# Patient Record
Sex: MALE | Race: WHITE | NOT HISPANIC OR LATINO | URBAN - METROPOLITAN AREA
[De-identification: names, ages, dates, MRNs, and addresses within clinical notes are randomized per-mention and may not be internally consistent; named-entity substitution may affect disease eponyms.]

---

## 2018-03-12 ENCOUNTER — EMERGENCY (EMERGENCY)
Facility: HOSPITAL | Age: 33
LOS: 1 days | Discharge: HOME | End: 2018-03-12
Attending: EMERGENCY MEDICINE | Admitting: EMERGENCY MEDICINE
Payer: COMMERCIAL

## 2018-03-12 VITALS
SYSTOLIC BLOOD PRESSURE: 127 MMHG | DIASTOLIC BLOOD PRESSURE: 73 MMHG | RESPIRATION RATE: 16 BRPM | TEMPERATURE: 98 F | HEART RATE: 71 BPM | OXYGEN SATURATION: 98 %

## 2018-03-12 VITALS
SYSTOLIC BLOOD PRESSURE: 131 MMHG | HEART RATE: 76 BPM | TEMPERATURE: 98 F | OXYGEN SATURATION: 96 % | RESPIRATION RATE: 18 BRPM | DIASTOLIC BLOOD PRESSURE: 75 MMHG

## 2018-03-12 PROCEDURE — 99284 EMERGENCY DEPT VISIT MOD MDM: CPT | Mod: 25

## 2018-03-12 PROCEDURE — 99283 EMERGENCY DEPT VISIT LOW MDM: CPT | Mod: 25

## 2018-03-12 PROCEDURE — 93010 ELECTROCARDIOGRAM REPORT: CPT

## 2018-03-12 PROCEDURE — 93005 ELECTROCARDIOGRAM TRACING: CPT

## 2018-03-12 NOTE — ED ADULT NURSE NOTE - OBJECTIVE STATEMENT
31 yo M no pmh came to ED c/o numbness in right arm and leg and SOB starting around 05:30 this morning. Pt states that he never had any symptoms like this before.  States that he woke up, noticed numbness, or pins and needles, in his arm, started walking around and noticed the sensation in his leg, both self-resolved.  Pt has left sided jaw pain due to tooth infection, currently being treated with antibiotic that he's been on for one week.   Denies chest pain, back pain, SOB, fevers/chills, feeling anxious, n/v/d, lightheadedness, dizziness, changes in urinary or bowel habits, changes in vision.  A&Ox4, neurologically intact, no symptoms at this time.  +strength and sensation.  Able to answer questions and follow commands.  PERRLA.  Skin w/s/i, lungs clear bilaterally.  Safety and comfort maintained.  Family present at bedside.  Will continue to monitor.

## 2018-03-12 NOTE — ED ADULT NURSE NOTE - CHPI ED SYMPTOMS NEG
no weakness/no fever/no confusion/no dizziness/no change in level of consciousness/no vomiting/no blurred vision/no loss of consciousness/no nausea

## 2018-03-12 NOTE — ED PROVIDER NOTE - OBJECTIVE STATEMENT
pt is a 33 y/o male with l sided tmj tend noted, with recent abx use with one week of amoxil, no f/c has a dental appt today.  pt awoke with l sided numbness and sob, likely panic attack with h/o anxiety. vss, ekg nl heent exam otherwise normal to d.c home with dental/pmd follow up.

## 2018-03-12 NOTE — ED PROVIDER NOTE - PLAN OF CARE
1) You were here for facial pain.    2) Take motrin or tylenol for your pain.    3) Follow up with your primary doctor for further evaluation and to answer any questions you have.    4) Return to the emergency department if you experience worsening symptoms, pain, fever, chills, nausea, vomiting or other concerning symptoms.

## 2018-03-12 NOTE — ED PROVIDER NOTE - ATTENDING CONTRIBUTION TO CARE
pt is a 31 y/o male with l sided tmj tend noted, with recent abx use with one week of amoxil, no f/c has a dental appt today.  pt awoke with l sided numbness and sob, likely panic attack with h/o anxiety. vss, ekg nl heent exam otherwise normal to d.c home with dental/pmd follow up.

## 2018-03-12 NOTE — ED PROVIDER NOTE - CARE PLAN
Principal Discharge DX:	Facial pain  Assessment and plan of treatment:	1) You were here for facial pain.    2) Take motrin or tylenol for your pain.    3) Follow up with your primary doctor for further evaluation and to answer any questions you have.    4) Return to the emergency department if you experience worsening symptoms, pain, fever, chills, nausea, vomiting or other concerning symptoms.

## 2021-02-16 ENCOUNTER — NON-APPOINTMENT (OUTPATIENT)
Age: 36
End: 2021-02-16

## 2021-02-16 DIAGNOSIS — R19.8 OTHER SPECIFIED SYMPTOMS AND SIGNS INVOLVING THE DIGESTIVE SYSTEM AND ABDOMEN: ICD-10-CM

## 2021-02-16 DIAGNOSIS — Z78.9 OTHER SPECIFIED HEALTH STATUS: ICD-10-CM

## 2021-02-16 RX ORDER — PANTOPRAZOLE 40 MG/1
40 TABLET, DELAYED RELEASE ORAL DAILY
Refills: 0 | Status: ACTIVE | COMMUNITY

## 2021-02-16 RX ORDER — SODIUM SULFATE, POTASSIUM SULFATE, MAGNESIUM SULFATE 17.5; 3.13; 1.6 G/ML; G/ML; G/ML
SOLUTION, CONCENTRATE ORAL
Refills: 0 | Status: ACTIVE | COMMUNITY

## 2021-02-16 RX ORDER — DICYCLOMINE HYDROCHLORIDE 10 MG/1
10 CAPSULE ORAL
Refills: 0 | Status: ACTIVE | COMMUNITY

## 2021-02-25 ENCOUNTER — APPOINTMENT (OUTPATIENT)
Dept: GASTROENTEROLOGY | Facility: AMBULATORY SURGERY CENTER | Age: 36
End: 2021-02-25
Payer: COMMERCIAL

## 2021-02-25 PROCEDURE — 45380 COLONOSCOPY AND BIOPSY: CPT

## 2021-03-15 DIAGNOSIS — K21.9 GASTRO-ESOPHAGEAL REFLUX DISEASE W/OUT ESOPHAGITIS: ICD-10-CM

## 2021-03-15 RX ORDER — PANTOPRAZOLE 40 MG/1
40 TABLET, DELAYED RELEASE ORAL DAILY
Qty: 90 | Refills: 3 | Status: ACTIVE | COMMUNITY
Start: 2021-03-15 | End: 1900-01-01

## 2021-04-07 ENCOUNTER — APPOINTMENT (OUTPATIENT)
Dept: GASTROENTEROLOGY | Facility: CLINIC | Age: 36
End: 2021-04-07

## 2023-06-04 ENCOUNTER — EMERGENCY (EMERGENCY)
Facility: HOSPITAL | Age: 38
LOS: 1 days | Discharge: HOME | End: 2023-06-04
Attending: EMERGENCY MEDICINE
Payer: COMMERCIAL

## 2023-06-04 VITALS
SYSTOLIC BLOOD PRESSURE: 135 MMHG | WEIGHT: 179.9 LBS | OXYGEN SATURATION: 100 % | HEART RATE: 78 BPM | RESPIRATION RATE: 15 BRPM | DIASTOLIC BLOOD PRESSURE: 79 MMHG | HEIGHT: 69 IN | TEMPERATURE: 98 F

## 2023-06-04 PROCEDURE — 99284 EMERGENCY DEPT VISIT MOD MDM: CPT

## 2023-06-05 ENCOUNTER — TRANSCRIPTION ENCOUNTER (OUTPATIENT)
Age: 38
End: 2023-06-05

## 2023-06-05 VITALS
TEMPERATURE: 98 F | HEART RATE: 72 BPM | SYSTOLIC BLOOD PRESSURE: 130 MMHG | OXYGEN SATURATION: 100 % | RESPIRATION RATE: 18 BRPM | DIASTOLIC BLOOD PRESSURE: 72 MMHG

## 2023-06-05 LAB
ALBUMIN SERPL ELPH-MCNC: 4.6 G/DL — SIGNIFICANT CHANGE UP (ref 3.3–5)
ALP SERPL-CCNC: 60 U/L — SIGNIFICANT CHANGE UP (ref 40–120)
ALT FLD-CCNC: 50 U/L — HIGH (ref 10–45)
ANION GAP SERPL CALC-SCNC: 15 MMOL/L — SIGNIFICANT CHANGE UP (ref 5–17)
APPEARANCE UR: CLEAR — SIGNIFICANT CHANGE UP
AST SERPL-CCNC: 22 U/L — SIGNIFICANT CHANGE UP (ref 10–40)
BACTERIA # UR AUTO: NEGATIVE — SIGNIFICANT CHANGE UP
BILIRUB SERPL-MCNC: 0.2 MG/DL — SIGNIFICANT CHANGE UP (ref 0.2–1.2)
BILIRUB UR-MCNC: NEGATIVE — SIGNIFICANT CHANGE UP
BUN SERPL-MCNC: 14 MG/DL — SIGNIFICANT CHANGE UP (ref 7–23)
CALCIUM SERPL-MCNC: 9.1 MG/DL — SIGNIFICANT CHANGE UP (ref 8.4–10.5)
CHLORIDE SERPL-SCNC: 105 MMOL/L — SIGNIFICANT CHANGE UP (ref 96–108)
CO2 SERPL-SCNC: 20 MMOL/L — LOW (ref 22–31)
COLOR SPEC: SIGNIFICANT CHANGE UP
CREAT SERPL-MCNC: 0.81 MG/DL — SIGNIFICANT CHANGE UP (ref 0.5–1.3)
DIFF PNL FLD: NEGATIVE — SIGNIFICANT CHANGE UP
EGFR: 116 ML/MIN/1.73M2 — SIGNIFICANT CHANGE UP
EPI CELLS # UR: 0 /HPF — SIGNIFICANT CHANGE UP
GLUCOSE SERPL-MCNC: 103 MG/DL — HIGH (ref 70–99)
GLUCOSE UR QL: NEGATIVE — SIGNIFICANT CHANGE UP
HCT VFR BLD CALC: 43.9 % — SIGNIFICANT CHANGE UP (ref 39–50)
HGB BLD-MCNC: 15.5 G/DL — SIGNIFICANT CHANGE UP (ref 13–17)
KETONES UR-MCNC: NEGATIVE — SIGNIFICANT CHANGE UP
LEUKOCYTE ESTERASE UR-ACNC: NEGATIVE — SIGNIFICANT CHANGE UP
LIDOCAIN IGE QN: 17 U/L — SIGNIFICANT CHANGE UP (ref 7–60)
MCHC RBC-ENTMCNC: 30.9 PG — SIGNIFICANT CHANGE UP (ref 27–34)
MCHC RBC-ENTMCNC: 35.3 GM/DL — SIGNIFICANT CHANGE UP (ref 32–36)
MCV RBC AUTO: 87.5 FL — SIGNIFICANT CHANGE UP (ref 80–100)
NITRITE UR-MCNC: NEGATIVE — SIGNIFICANT CHANGE UP
NRBC # BLD: 0 /100 WBCS — SIGNIFICANT CHANGE UP (ref 0–0)
PH UR: 6.5 — SIGNIFICANT CHANGE UP (ref 5–8)
PLATELET # BLD AUTO: 260 K/UL — SIGNIFICANT CHANGE UP (ref 150–400)
POTASSIUM SERPL-MCNC: 3.8 MMOL/L — SIGNIFICANT CHANGE UP (ref 3.5–5.3)
POTASSIUM SERPL-SCNC: 3.8 MMOL/L — SIGNIFICANT CHANGE UP (ref 3.5–5.3)
PROT SERPL-MCNC: 7.2 G/DL — SIGNIFICANT CHANGE UP (ref 6–8.3)
PROT UR-MCNC: SIGNIFICANT CHANGE UP
RBC # BLD: 5.02 M/UL — SIGNIFICANT CHANGE UP (ref 4.2–5.8)
RBC # FLD: 11.9 % — SIGNIFICANT CHANGE UP (ref 10.3–14.5)
RBC CASTS # UR COMP ASSIST: 1 /HPF — SIGNIFICANT CHANGE UP (ref 0–4)
SODIUM SERPL-SCNC: 140 MMOL/L — SIGNIFICANT CHANGE UP (ref 135–145)
SP GR SPEC: 1.02 — SIGNIFICANT CHANGE UP (ref 1.01–1.02)
UROBILINOGEN FLD QL: NEGATIVE — SIGNIFICANT CHANGE UP
WBC # BLD: 11.01 K/UL — HIGH (ref 3.8–10.5)
WBC # FLD AUTO: 11.01 K/UL — HIGH (ref 3.8–10.5)
WBC UR QL: 1 /HPF — SIGNIFICANT CHANGE UP (ref 0–5)

## 2023-06-05 PROCEDURE — 99284 EMERGENCY DEPT VISIT MOD MDM: CPT

## 2023-06-05 PROCEDURE — 83690 ASSAY OF LIPASE: CPT

## 2023-06-05 PROCEDURE — 80053 COMPREHEN METABOLIC PANEL: CPT

## 2023-06-05 PROCEDURE — 87086 URINE CULTURE/COLONY COUNT: CPT

## 2023-06-05 PROCEDURE — 81001 URINALYSIS AUTO W/SCOPE: CPT

## 2023-06-05 PROCEDURE — 36415 COLL VENOUS BLD VENIPUNCTURE: CPT

## 2023-06-05 PROCEDURE — 85027 COMPLETE CBC AUTOMATED: CPT

## 2023-06-05 NOTE — ED PROVIDER NOTE - NSFOLLOWUPINSTRUCTIONS_ED_ALL_ED_FT
You were evaluated in the emergency room for abdominal pain your blood work and urine test were reassuring.  Please follow-up with your primary care doctor  to discuss your ER visit and symptoms.  You will also need to follow-up with a gastroenterologist.  If you do not currently have 1 you may call the [facility] referral hotline at [phone]–NPLS.  You may take over-the-counter Tylenol or ibuprofen per package instructions as needed for discomfort.  Please seek immediate medical attention if your symptoms are worsening of your concerns.

## 2023-06-05 NOTE — ED ADULT NURSE NOTE - OBJECTIVE STATEMENT
Pt is 38M, denies pmhx, c/o abdominal pain, overall discomfort, back pain for a few months for pt. Pt states he has not been feeling well for a few months, weakness, intermittent abdominal pain and lower back pain, pt denies any fever, chills, NVD, SOB, chest pain, or any other symptoms. Pt is A&Ox3, does not have pcp, pt states he has been feeling unwell and decided to come to ED to get checked. Pt updated on plan of care, comfort and safety secured

## 2023-06-05 NOTE — ED PROVIDER NOTE - DIFFERENTIAL DIAGNOSIS
functional bowel disease: IBS, diet intolerance,  ureteral colic, constipation, gas, gastritis Differential Diagnosis

## 2023-06-05 NOTE — ED ADULT NURSE NOTE - NSFALLUNIVINTERV_ED_ALL_ED
Bed/Stretcher in lowest position, wheels locked, appropriate side rails in place/Call bell, personal items and telephone in reach/Instruct patient to call for assistance before getting out of bed/chair/stretcher/Non-slip footwear applied when patient is off stretcher/[place] to call system/Physically safe environment - no spills, clutter or unnecessary equipment/Purposeful proactive rounding/Room/bathroom lighting operational, light cord in reach

## 2023-06-05 NOTE — ED PROVIDER NOTE - OBJECTIVE STATEMENT
38 year old M with no PMH presenting with abdominal pain. Pain is L flank to LLQ. Intermittent. Been ongoing for months. Patient was concerned this persisted so came to ED. Patient denies history of STIs, nephrolithiasis, testicular pain or swelling. Denies fevers, hematuria, dysuria, constipation, diarrhea, N/V. 38 year old M with no PMH presenting with abdominal pain. Pain is L flank to LLQ. Intermittent. Been ongoing for months. Not currently present. Patient was concerned this persisted so came to ED. Patient denies history of STIs, nephrolithiasis, testicular pain or swelling. Denies fevers, hematuria, dysuria, constipation, diarrhea, N/V.

## 2023-06-05 NOTE — ED PROVIDER NOTE - PATIENT PORTAL LINK FT
You can access the FollowMyHealth Patient Portal offered by [facility] by registering at the following website: [email or web address]. By joining [facility]’s FollowMyHealth portal, you will also be able to view your health information using other applications (apps) compatible with our system.

## 2023-06-05 NOTE — ED PROVIDER NOTE - CLINICAL SUMMARY MEDICAL DECISION MAKING FREE TEXT BOX
38 year old M with no PMH presenting with abdominal pain. VSS, afebrile, well appearing. Abdomen soft and non tender, no CVA tenderness. Low suspicion for nephrolithiasis or other acute intra-abdominal pathology given history and physical. Will get basic labs, UA. Anticipate DC with GI f/u.

## 2023-06-06 LAB
CULTURE RESULTS: SIGNIFICANT CHANGE UP
SPECIMEN SOURCE: SIGNIFICANT CHANGE UP

## 2023-09-05 ENCOUNTER — NON-APPOINTMENT (OUTPATIENT)
Age: 38
End: 2023-09-05

## 2023-09-21 ENCOUNTER — EMERGENCY (EMERGENCY)
Facility: HOSPITAL | Age: 38
LOS: 1 days | Discharge: HOME | End: 2023-09-21
Attending: EMERGENCY MEDICINE
Payer: COMMERCIAL

## 2023-09-21 VITALS
WEIGHT: 175.05 LBS | TEMPERATURE: 98 F | OXYGEN SATURATION: 99 % | RESPIRATION RATE: 20 BRPM | DIASTOLIC BLOOD PRESSURE: 86 MMHG | HEART RATE: 91 BPM | HEIGHT: 65 IN | SYSTOLIC BLOOD PRESSURE: 131 MMHG

## 2023-09-21 VITALS
HEART RATE: 74 BPM | TEMPERATURE: 98 F | SYSTOLIC BLOOD PRESSURE: 125 MMHG | OXYGEN SATURATION: 98 % | RESPIRATION RATE: 16 BRPM | DIASTOLIC BLOOD PRESSURE: 85 MMHG

## 2023-09-21 LAB
ALBUMIN SERPL ELPH-MCNC: 4.8 G/DL — SIGNIFICANT CHANGE UP (ref 3.3–5)
ALP SERPL-CCNC: 66 U/L — SIGNIFICANT CHANGE UP (ref 40–120)
ALT FLD-CCNC: 206 U/L — HIGH (ref 10–45)
ANION GAP SERPL CALC-SCNC: 15 MMOL/L — SIGNIFICANT CHANGE UP (ref 5–17)
AST SERPL-CCNC: 54 U/L — HIGH (ref 10–40)
BASOPHILS # BLD AUTO: 0.05 K/UL — SIGNIFICANT CHANGE UP (ref 0–0.2)
BASOPHILS NFR BLD AUTO: 0.6 % — SIGNIFICANT CHANGE UP (ref 0–2)
BILIRUB SERPL-MCNC: 0.5 MG/DL — SIGNIFICANT CHANGE UP (ref 0.2–1.2)
BUN SERPL-MCNC: 16 MG/DL — SIGNIFICANT CHANGE UP (ref 7–23)
CALCIUM SERPL-MCNC: 10 MG/DL — SIGNIFICANT CHANGE UP (ref 8.4–10.5)
CHLORIDE SERPL-SCNC: 102 MMOL/L — SIGNIFICANT CHANGE UP (ref 96–108)
CO2 SERPL-SCNC: 23 MMOL/L — SIGNIFICANT CHANGE UP (ref 22–31)
CREAT SERPL-MCNC: 0.89 MG/DL — SIGNIFICANT CHANGE UP (ref 0.5–1.3)
EGFR: 113 ML/MIN/1.73M2 — SIGNIFICANT CHANGE UP
EOSINOPHIL # BLD AUTO: 0.1 K/UL — SIGNIFICANT CHANGE UP (ref 0–0.5)
EOSINOPHIL NFR BLD AUTO: 1.3 % — SIGNIFICANT CHANGE UP (ref 0–6)
GLUCOSE SERPL-MCNC: 103 MG/DL — HIGH (ref 70–99)
HCT VFR BLD CALC: 48.9 % — SIGNIFICANT CHANGE UP (ref 39–50)
HGB BLD-MCNC: 16.6 G/DL — SIGNIFICANT CHANGE UP (ref 13–17)
IMM GRANULOCYTES NFR BLD AUTO: 0.5 % — SIGNIFICANT CHANGE UP (ref 0–0.9)
LYMPHOCYTES # BLD AUTO: 2.02 K/UL — SIGNIFICANT CHANGE UP (ref 1–3.3)
LYMPHOCYTES # BLD AUTO: 25.4 % — SIGNIFICANT CHANGE UP (ref 13–44)
MCHC RBC-ENTMCNC: 30.6 PG — SIGNIFICANT CHANGE UP (ref 27–34)
MCHC RBC-ENTMCNC: 33.9 GM/DL — SIGNIFICANT CHANGE UP (ref 32–36)
MCV RBC AUTO: 90.1 FL — SIGNIFICANT CHANGE UP (ref 80–100)
MONOCYTES # BLD AUTO: 0.77 K/UL — SIGNIFICANT CHANGE UP (ref 0–0.9)
MONOCYTES NFR BLD AUTO: 9.7 % — SIGNIFICANT CHANGE UP (ref 2–14)
NEUTROPHILS # BLD AUTO: 4.98 K/UL — SIGNIFICANT CHANGE UP (ref 1.8–7.4)
NEUTROPHILS NFR BLD AUTO: 62.5 % — SIGNIFICANT CHANGE UP (ref 43–77)
NRBC # BLD: 0 /100 WBCS — SIGNIFICANT CHANGE UP (ref 0–0)
PLATELET # BLD AUTO: 311 K/UL — SIGNIFICANT CHANGE UP (ref 150–400)
POTASSIUM SERPL-MCNC: 4.3 MMOL/L — SIGNIFICANT CHANGE UP (ref 3.5–5.3)
POTASSIUM SERPL-SCNC: 4.3 MMOL/L — SIGNIFICANT CHANGE UP (ref 3.5–5.3)
PROT SERPL-MCNC: 7.7 G/DL — SIGNIFICANT CHANGE UP (ref 6–8.3)
RBC # BLD: 5.43 M/UL — SIGNIFICANT CHANGE UP (ref 4.2–5.8)
RBC # FLD: 11.6 % — SIGNIFICANT CHANGE UP (ref 10.3–14.5)
SODIUM SERPL-SCNC: 140 MMOL/L — SIGNIFICANT CHANGE UP (ref 135–145)
WBC # BLD: 7.96 K/UL — SIGNIFICANT CHANGE UP (ref 3.8–10.5)
WBC # FLD AUTO: 7.96 K/UL — SIGNIFICANT CHANGE UP (ref 3.8–10.5)

## 2023-09-21 PROCEDURE — 99284 EMERGENCY DEPT VISIT MOD MDM: CPT | Mod: 25

## 2023-09-21 PROCEDURE — 70487 CT MAXILLOFACIAL W/DYE: CPT | Mod: 26,MA

## 2023-09-21 PROCEDURE — 36415 COLL VENOUS BLD VENIPUNCTURE: CPT

## 2023-09-21 PROCEDURE — 70487 CT MAXILLOFACIAL W/DYE: CPT | Mod: MA

## 2023-09-21 PROCEDURE — 70450 CT HEAD/BRAIN W/O DYE: CPT | Mod: MA

## 2023-09-21 PROCEDURE — 80053 COMPREHEN METABOLIC PANEL: CPT

## 2023-09-21 PROCEDURE — 99285 EMERGENCY DEPT VISIT HI MDM: CPT

## 2023-09-21 PROCEDURE — 85025 COMPLETE CBC W/AUTO DIFF WBC: CPT

## 2023-09-21 PROCEDURE — 70450 CT HEAD/BRAIN W/O DYE: CPT | Mod: 26,MA

## 2023-09-21 RX ORDER — METOCLOPRAMIDE HCL 10 MG
10 TABLET ORAL ONCE
Refills: 0 | Status: COMPLETED | OUTPATIENT
Start: 2023-09-21 | End: 2023-09-21

## 2023-09-21 RX ORDER — SODIUM CHLORIDE 9 MG/ML
1000 INJECTION INTRAMUSCULAR; INTRAVENOUS; SUBCUTANEOUS ONCE
Refills: 0 | Status: COMPLETED | OUTPATIENT
Start: 2023-09-21 | End: 2023-09-21

## 2023-09-21 RX ORDER — ACETAMINOPHEN 500 MG
1000 TABLET ORAL ONCE
Refills: 0 | Status: COMPLETED | OUTPATIENT
Start: 2023-09-21 | End: 2023-09-21

## 2023-09-21 RX ADMIN — SODIUM CHLORIDE 1000 MILLILITER(S): 9 INJECTION INTRAMUSCULAR; INTRAVENOUS; SUBCUTANEOUS at 10:05

## 2023-09-21 NOTE — ED PROVIDER NOTE - PROGRESS NOTE DETAILS
Eliecer Tran MD (PGY3): Patient with CT negative for  acute intracranial process.  No overt sinusitis.  Left maxillary inflammation.  Patient already on antibiotics.  Will give dental follow-up.  And neurology follow-up as well.  Strict return precautions discussed.

## 2023-09-21 NOTE — ED PROVIDER NOTE - PATIENT PORTAL LINK FT
You can access the FollowMyHealth Patient Portal offered by [facility] by registering at the following website: [email or web address]. By joining [facility]’s FollowMyHealth portal, you will also be able to view your health information using other applications (apps) compatible with our system.

## 2023-09-21 NOTE — ED ADULT TRIAGE NOTE - CHIEF COMPLAINT QUOTE
Intermittent pressure headache x 2 months. Denies nausea, vomiting & fever. Currently taking cefuroxime by ENt.

## 2023-09-21 NOTE — ED PROVIDER NOTE - NSFOLLOWUPINSTRUCTIONS_ED_ALL_ED_FT
You were seen in the Emergency Department for headache. Lab and imaging results, if performed, were discussed with you along with your discharge diagnosis.    You will receive a call to make an appointment with Neurology and Dental. List of providers and their information has been given. Follow up with your doctor in 1 week - bring copies of your results if you were given. If you do not have a primary doctor, please call [phone] to find one convenient for you.    Continue all prescribed medications.     To control your pain at home, you should take Ibuprofen 400 mg along with Tylenol 650mg-1000mg every 6 to 8 hours. Limit your maximum daily Tylenol from all sources to 4000mg. Be aware that many other medications contain acetaminophen which is also known as Tylenol. Taking Tylenol and Ibuprofen together has been shown to be more effective at relieving pain than taking them separately. These are both over the counter medications that you can  at your local pharmacy without a prescription. You need to respect all of the warnings on the bottles. You shouldn’t take these medications for more than a week without following up with your doctor. Both medications come with certain risks and side effects that you need to discuss with your doctor, especially if you are taking them for a prolonged period.    Return to ED for any new or worsening symptoms including but not limited to: development of chest pain, shortness of breath, fever, vomiting, focal numbness, weakness or tingling, any severe CP, headache, abdominal pain, back pain.      Rest and keep yourself hydrated with fluids

## 2023-09-21 NOTE — ED PROVIDER NOTE - OBJECTIVE STATEMENT
Patient is a 38-year-old male with no past medical treatment and to ED with headache x2 months.  Patient really describes headache as pressure.  Says it happens every day.  Does not wake up with it.  It worsens over the day.  Patient states that he also had sinus pressure.  Was treated for sinusitis x2, once with Augmentin and is currently on cefuroxime.  Follow-up with ENT.  States that his sinus pressure has improved.  No vision changes, nausea, vomiting, fever,  chills, neck stiffness.   patient travel to [place] about more than 2 months ago however was only major studies.  No history of aneurysms or main masses.  No family history of any brain masses as well.  Patient is a [employer] officer.  No falls or injuries.

## 2023-09-21 NOTE — ED PROVIDER NOTE - PHYSICAL EXAMINATION
General: Alert and Orientated x 3. No apparent distress.  Head: Normocephalic and atraumatic.  Eyes: PERRLA with EOMI.  Neck: Supple. Trachea midline.   Cardiac: Normal S1 and S2 w/ RRR. No murmurs appreciated. No JVD appreciated.  Pulmonary: CTA bilaterally. No increased WOB. No wheezes or crackles.  Abdominal: Soft, non-tender. (+) bowel sounds appreciated in all 4 quadrants. No hepatosplenomegaly.   Neurologic: No focal sensory or motor deficits.   Cranials 2-12 grossly intact.  No dysmetria.  Gait normal.  No sinus tenderness to palpation.  Musculoskeletal: Strength appropriate in all 4 extremities for age with no limited ROM.  Skin: Color appropriate for race. Intact, warm, and well-perfused.  Psychiatric: Appropriate mood and affect. No apparent risk to self or others.

## 2023-09-21 NOTE — ED ADULT NURSE NOTE - OBJECTIVE STATEMENT
38 year old male patient presents ambulatory to ED c/o intermittent headache x 2 months. Patient reports feeling "ringing" in his head and feeling like his "brain is sloshing around his head." Patient went to ENT and was started on cefuroxime for sinus infection. Patient denies discomfort currently and is declining medication at this time. Patient denies change in vision, dizziness, numbness, tingling, CP, palpitations, n/v/d, fever, chills. Patient states he came today to ED

## 2023-09-21 NOTE — ED PROVIDER NOTE - DIFFERENTIAL DIAGNOSIS
Ddx includes, however, is not limited to: migraine, tension ha, sinus infection, other Differential Diagnosis

## 2023-09-21 NOTE — ED PROVIDER NOTE - NSFOLLOWUPCLINICS_GEN_ALL_ED_FT
[facility]  [facility]  [address] [address] [place] [address]  Phone: [phone]  Fax:     [facility]  Dental  [address] [place] [address]  Phone: [phone]  Fax:

## 2023-09-21 NOTE — ED PROVIDER NOTE - CLINICAL SUMMARY MEDICAL DECISION MAKING FREE TEXT BOX
STEVEN Valencia MD: 38M without sig PMH who p/w c/o HA x 2 mo. Denies focal neurologic deficits. Saw outpt providers and was prescribed 2 courses of abx for possible sinus infection. Returns for persistant HA and request for brain imaging. Exam benign. Plan: pain control, basic labs, CTH/maxface, reassess

## 2023-10-19 ENCOUNTER — EMERGENCY (EMERGENCY)
Facility: HOSPITAL | Age: 38
LOS: 1 days | Discharge: HOME | End: 2023-10-19
Admitting: EMERGENCY MEDICINE
Payer: OTHER MISCELLANEOUS

## 2023-10-19 VITALS
RESPIRATION RATE: 17 BRPM | DIASTOLIC BLOOD PRESSURE: 97 MMHG | SYSTOLIC BLOOD PRESSURE: 149 MMHG | OXYGEN SATURATION: 98 % | HEART RATE: 96 BPM

## 2023-10-19 LAB
ALBUMIN SERPL ELPH-MCNC: 5.1 G/DL — HIGH (ref 3.3–5)
ALBUMIN SERPL ELPH-MCNC: 5.1 G/DL — HIGH (ref 3.3–5)
ALP SERPL-CCNC: 75 U/L — SIGNIFICANT CHANGE UP (ref 40–120)
ALP SERPL-CCNC: 75 U/L — SIGNIFICANT CHANGE UP (ref 40–120)
ALT FLD-CCNC: 61 U/L — HIGH (ref 4–41)
ALT FLD-CCNC: 61 U/L — HIGH (ref 4–41)
ANION GAP SERPL CALC-SCNC: 16 MMOL/L — HIGH (ref 7–14)
ANION GAP SERPL CALC-SCNC: 16 MMOL/L — HIGH (ref 7–14)
AST SERPL-CCNC: 24 U/L — SIGNIFICANT CHANGE UP (ref 4–40)
AST SERPL-CCNC: 24 U/L — SIGNIFICANT CHANGE UP (ref 4–40)
BASOPHILS # BLD AUTO: 0.06 K/UL — SIGNIFICANT CHANGE UP (ref 0–0.2)
BASOPHILS # BLD AUTO: 0.06 K/UL — SIGNIFICANT CHANGE UP (ref 0–0.2)
BASOPHILS NFR BLD AUTO: 0.5 % — SIGNIFICANT CHANGE UP (ref 0–2)
BASOPHILS NFR BLD AUTO: 0.5 % — SIGNIFICANT CHANGE UP (ref 0–2)
BILIRUB SERPL-MCNC: 0.4 MG/DL — SIGNIFICANT CHANGE UP (ref 0.2–1.2)
BILIRUB SERPL-MCNC: 0.4 MG/DL — SIGNIFICANT CHANGE UP (ref 0.2–1.2)
BUN SERPL-MCNC: 14 MG/DL — SIGNIFICANT CHANGE UP (ref 7–23)
BUN SERPL-MCNC: 14 MG/DL — SIGNIFICANT CHANGE UP (ref 7–23)
CALCIUM SERPL-MCNC: 10.2 MG/DL — SIGNIFICANT CHANGE UP (ref 8.4–10.5)
CALCIUM SERPL-MCNC: 10.2 MG/DL — SIGNIFICANT CHANGE UP (ref 8.4–10.5)
CHLORIDE SERPL-SCNC: 102 MMOL/L — SIGNIFICANT CHANGE UP (ref 98–107)
CHLORIDE SERPL-SCNC: 102 MMOL/L — SIGNIFICANT CHANGE UP (ref 98–107)
CO2 SERPL-SCNC: 22 MMOL/L — SIGNIFICANT CHANGE UP (ref 22–31)
CO2 SERPL-SCNC: 22 MMOL/L — SIGNIFICANT CHANGE UP (ref 22–31)
CREAT SERPL-MCNC: 0.86 MG/DL — SIGNIFICANT CHANGE UP (ref 0.5–1.3)
CREAT SERPL-MCNC: 0.86 MG/DL — SIGNIFICANT CHANGE UP (ref 0.5–1.3)
EGFR: 114 ML/MIN/1.73M2 — SIGNIFICANT CHANGE UP
EGFR: 114 ML/MIN/1.73M2 — SIGNIFICANT CHANGE UP
EOSINOPHIL # BLD AUTO: 0.12 K/UL — SIGNIFICANT CHANGE UP (ref 0–0.5)
EOSINOPHIL # BLD AUTO: 0.12 K/UL — SIGNIFICANT CHANGE UP (ref 0–0.5)
EOSINOPHIL NFR BLD AUTO: 0.9 % — SIGNIFICANT CHANGE UP (ref 0–6)
EOSINOPHIL NFR BLD AUTO: 0.9 % — SIGNIFICANT CHANGE UP (ref 0–6)
GLUCOSE SERPL-MCNC: 95 MG/DL — SIGNIFICANT CHANGE UP (ref 70–99)
GLUCOSE SERPL-MCNC: 95 MG/DL — SIGNIFICANT CHANGE UP (ref 70–99)
HCT VFR BLD CALC: 46.1 % — SIGNIFICANT CHANGE UP (ref 39–50)
HCT VFR BLD CALC: 46.1 % — SIGNIFICANT CHANGE UP (ref 39–50)
HGB BLD-MCNC: 16.1 G/DL — SIGNIFICANT CHANGE UP (ref 13–17)
HGB BLD-MCNC: 16.1 G/DL — SIGNIFICANT CHANGE UP (ref 13–17)
IANC: 9.38 K/UL — HIGH (ref 1.8–7.4)
IANC: 9.38 K/UL — HIGH (ref 1.8–7.4)
IMM GRANULOCYTES NFR BLD AUTO: 0.2 % — SIGNIFICANT CHANGE UP (ref 0–0.9)
IMM GRANULOCYTES NFR BLD AUTO: 0.2 % — SIGNIFICANT CHANGE UP (ref 0–0.9)
LYMPHOCYTES # BLD AUTO: 19.9 % — SIGNIFICANT CHANGE UP (ref 13–44)
LYMPHOCYTES # BLD AUTO: 19.9 % — SIGNIFICANT CHANGE UP (ref 13–44)
LYMPHOCYTES # BLD AUTO: 2.61 K/UL — SIGNIFICANT CHANGE UP (ref 1–3.3)
LYMPHOCYTES # BLD AUTO: 2.61 K/UL — SIGNIFICANT CHANGE UP (ref 1–3.3)
MCHC RBC-ENTMCNC: 31 PG — SIGNIFICANT CHANGE UP (ref 27–34)
MCHC RBC-ENTMCNC: 31 PG — SIGNIFICANT CHANGE UP (ref 27–34)
MCHC RBC-ENTMCNC: 34.9 GM/DL — SIGNIFICANT CHANGE UP (ref 32–36)
MCHC RBC-ENTMCNC: 34.9 GM/DL — SIGNIFICANT CHANGE UP (ref 32–36)
MCV RBC AUTO: 88.8 FL — SIGNIFICANT CHANGE UP (ref 80–100)
MCV RBC AUTO: 88.8 FL — SIGNIFICANT CHANGE UP (ref 80–100)
MONOCYTES # BLD AUTO: 0.91 K/UL — HIGH (ref 0–0.9)
MONOCYTES # BLD AUTO: 0.91 K/UL — HIGH (ref 0–0.9)
MONOCYTES NFR BLD AUTO: 6.9 % — SIGNIFICANT CHANGE UP (ref 2–14)
MONOCYTES NFR BLD AUTO: 6.9 % — SIGNIFICANT CHANGE UP (ref 2–14)
NEUTROPHILS # BLD AUTO: 9.38 K/UL — HIGH (ref 1.8–7.4)
NEUTROPHILS # BLD AUTO: 9.38 K/UL — HIGH (ref 1.8–7.4)
NEUTROPHILS NFR BLD AUTO: 71.6 % — SIGNIFICANT CHANGE UP (ref 43–77)
NEUTROPHILS NFR BLD AUTO: 71.6 % — SIGNIFICANT CHANGE UP (ref 43–77)
NRBC # BLD: 0 /100 WBCS — SIGNIFICANT CHANGE UP (ref 0–0)
NRBC # BLD: 0 /100 WBCS — SIGNIFICANT CHANGE UP (ref 0–0)
NRBC # FLD: 0 K/UL — SIGNIFICANT CHANGE UP (ref 0–0)
NRBC # FLD: 0 K/UL — SIGNIFICANT CHANGE UP (ref 0–0)
PLATELET # BLD AUTO: 362 K/UL — SIGNIFICANT CHANGE UP (ref 150–400)
PLATELET # BLD AUTO: 362 K/UL — SIGNIFICANT CHANGE UP (ref 150–400)
POTASSIUM SERPL-MCNC: 4.1 MMOL/L — SIGNIFICANT CHANGE UP (ref 3.5–5.3)
POTASSIUM SERPL-MCNC: 4.1 MMOL/L — SIGNIFICANT CHANGE UP (ref 3.5–5.3)
POTASSIUM SERPL-SCNC: 4.1 MMOL/L — SIGNIFICANT CHANGE UP (ref 3.5–5.3)
POTASSIUM SERPL-SCNC: 4.1 MMOL/L — SIGNIFICANT CHANGE UP (ref 3.5–5.3)
PROT SERPL-MCNC: 8 G/DL — SIGNIFICANT CHANGE UP (ref 6–8.3)
PROT SERPL-MCNC: 8 G/DL — SIGNIFICANT CHANGE UP (ref 6–8.3)
RBC # BLD: 5.19 M/UL — SIGNIFICANT CHANGE UP (ref 4.2–5.8)
RBC # BLD: 5.19 M/UL — SIGNIFICANT CHANGE UP (ref 4.2–5.8)
RBC # FLD: 11.4 % — SIGNIFICANT CHANGE UP (ref 10.3–14.5)
RBC # FLD: 11.4 % — SIGNIFICANT CHANGE UP (ref 10.3–14.5)
SODIUM SERPL-SCNC: 140 MMOL/L — SIGNIFICANT CHANGE UP (ref 135–145)
SODIUM SERPL-SCNC: 140 MMOL/L — SIGNIFICANT CHANGE UP (ref 135–145)
TSH SERPL-MCNC: 3.08 UIU/ML — SIGNIFICANT CHANGE UP (ref 0.27–4.2)
TSH SERPL-MCNC: 3.08 UIU/ML — SIGNIFICANT CHANGE UP (ref 0.27–4.2)
WBC # BLD: 13.1 K/UL — HIGH (ref 3.8–10.5)
WBC # BLD: 13.1 K/UL — HIGH (ref 3.8–10.5)
WBC # FLD AUTO: 13.1 K/UL — HIGH (ref 3.8–10.5)
WBC # FLD AUTO: 13.1 K/UL — HIGH (ref 3.8–10.5)

## 2023-10-19 PROCEDURE — 93010 ELECTROCARDIOGRAM REPORT: CPT

## 2023-10-19 PROCEDURE — 71046 X-RAY EXAM CHEST 2 VIEWS: CPT | Mod: 26

## 2023-10-19 PROCEDURE — 99223 1ST HOSP IP/OBS HIGH 75: CPT

## 2023-10-19 RX ORDER — METOCLOPRAMIDE HCL 10 MG
10 TABLET ORAL ONCE
Refills: 0 | Status: COMPLETED | OUTPATIENT
Start: 2023-10-19 | End: 2023-10-19

## 2023-10-19 RX ORDER — ACETAMINOPHEN 500 MG
975 TABLET ORAL ONCE
Refills: 0 | Status: COMPLETED | OUTPATIENT
Start: 2023-10-19 | End: 2023-10-19

## 2023-10-19 RX ORDER — SODIUM CHLORIDE 9 MG/ML
1000 INJECTION INTRAMUSCULAR; INTRAVENOUS; SUBCUTANEOUS ONCE
Refills: 0 | Status: COMPLETED | OUTPATIENT
Start: 2023-10-19 | End: 2023-10-19

## 2023-10-19 RX ORDER — CYCLOBENZAPRINE HYDROCHLORIDE 10 MG/1
5 TABLET, FILM COATED ORAL ONCE
Refills: 0 | Status: COMPLETED | OUTPATIENT
Start: 2023-10-19 | End: 2023-10-19

## 2023-10-19 RX ADMIN — CYCLOBENZAPRINE HYDROCHLORIDE 5 MILLIGRAM(S): 10 TABLET, FILM COATED ORAL at 22:25

## 2023-10-19 RX ADMIN — Medication 10 MILLIGRAM(S): at 22:13

## 2023-10-19 RX ADMIN — SODIUM CHLORIDE 1000 MILLILITER(S): 9 INJECTION INTRAMUSCULAR; INTRAVENOUS; SUBCUTANEOUS at 22:11

## 2023-10-19 NOTE — ED PROVIDER NOTE - PROGRESS NOTE DETAILS
Per neuro recommending CDU for MRI, MRV brain and MR cervical spine. pt amenable with plan. spoke with CDU DAISY Florez.

## 2023-10-19 NOTE — CONSULT NOTE ADULT - ASSESSMENT
Assessment: 38 RH-M w/ no PMHx p/w 3 months daily pressure headache (L occipital, L temporal, b/l eyes) a/w neck stiffness, also w/ blurry vision superior temporal field L eye. Symptom onset days after fall onto L head/neck/shoulder as well as URI. HA improves while supine, worsens while standing. No alterations in mental status, diplopia, focal/lateralizing motor or sensory symptoms, seizures, or tinnitus. Today, episode of paresthesias x 4 extremities and "cramping" of distal b/l UE lasting 10-15 minutes. NIHSS: 0. VS: 149/97, HR 96, afebrile.     Impression: Three months of daily pressure left-sided HA a/w L neck stiffness w/ blurry vision superior temporal field L eye s/p L head/neck/shoulder injury, w/ acute onset of paresthesias x 4 extremities w/ hand cramping, resolved in 10-15 minutes. Unclear etiology at this time. Will rule out cerebral venous thrombosis and evaluate for syrinx.     Recommendations:    Disposition: CDU for imaging    Diagnostics:  [] MRI brain without contrast   [] MR venogram brain  [] MRI cervical spine without contrast  [] CBC, CMP, coagulation panel, troponin    Medications:  [] Symptomatic treatment as per primary team - can try muscle relaxers    Miscellaneous:  [] Telemonitoring  [] Neurological checks and vital signs per unit protocol    * Case and plan not final until Attending attestation * Assessment: 38 RH-M w/ no PMHx p/w 3 months daily pressure headache (L occipital, L temporal, b/l eyes) a/w neck stiffness, also w/ blurry vision superior temporal field L eye. Symptom onset days after fall onto L head/neck/shoulder as well as URI. HA improves while supine, worsens while standing. No alterations in mental status, diplopia, focal/lateralizing motor or sensory symptoms, seizures, or tinnitus. Today, episode of paresthesias x 4 extremities and "cramping" of distal b/l UE lasting 10-15 minutes. NIHSS: 0. VS: 149/97, HR 96, afebrile.     Impression: Three months of daily pressure left-sided HA a/w L neck stiffness w/ blurry vision superior temporal field L eye s/p L head/neck/shoulder injury, w/ acute onset of paresthesias x 4 extremities w/ hand cramping, resolved in 10-15 minutes. Unclear etiology at this time. Will rule out cerebral venous thrombosis and evaluate for syrinx.     Recommendations:    Disposition: CDU for imaging    Consultation: Ophthalmology consultation for blurry vision L eye (also, "chronic R eye blurry vision").    Diagnostics:  [] MRI brain without contrast   [] MR venogram brain  [] MRI cervical spine without contrast  [] CBC, CMP, coagulation panel, troponin    Medications:  [] Symptomatic treatment as per primary team - can try muscle relaxers    Miscellaneous:  [] Telemonitoring  [] Neurological checks and vital signs per unit protocol    * Case and plan not final until Attending attestation * Assessment: 38 RH-M w/ no PMHx p/w 3 months daily pressure headache (L occipital, L temporal, b/l eyes) a/w neck stiffness, also w/ blurry vision superior temporal field L eye. Symptom onset days after fall onto L head/neck/shoulder as well as URI. HA improves while supine, worsens while standing. No alterations in mental status, diplopia, focal/lateralizing motor or sensory symptoms, seizures, or tinnitus. Today, episode of paresthesias x 4 extremities and "cramping" of distal b/l UE lasting 10-15 minutes. NIHSS: 0. VS: 149/97, HR 96, afebrile.     Impression: Three months of daily left-sided pressure HA a/w L neck stiffness w/ blurry vision superior temporal field L eye s/p L head/neck/shoulder injury, w/ acute onset of paresthesias x 4 extremities w/ hand cramping, resolved in 10-15 minutes. Unclear etiology at this time. Will rule out cerebral venous thrombosis and evaluate for syrinx.     Recommendations:    Disposition: CDU for imaging    Consultation: Ophthalmology consultation for blurry vision L eye (also, "chronic R eye blurry vision").    Diagnostics:  [] MRI brain without contrast   [] MR venogram brain  [] MRI cervical spine without contrast  [] CBC, CMP, coagulation panel, troponin    Medications:  [] Symptomatic treatment as per primary team - can try muscle relaxers    Miscellaneous:  [] Telemonitoring  [] Neurological checks and vital signs per unit protocol    * Case and plan not final until Attending attestation * Assessment: 38 RH-M w/ no PMHx p/w 3 months daily pressure headache (L occipital, L temporal, b/l eyes) a/w neck stiffness, also w/ blurry vision superior temporal field L eye. Symptom onset days after fall onto L head/neck/shoulder as well as URI. HA improves while supine, worsens while standing. No alterations in mental status, diplopia, focal/lateralizing motor or sensory symptoms, seizures, or tinnitus. Today, episode of paresthesias x 4 extremities and "cramping" of distal b/l UE lasting 10-15 minutes. NIHSS: 0. VS: 149/97, HR 96, afebrile.     Impression: Three months of daily left-sided pressure HA a/w L neck stiffness w/ blurry vision superior temporal field L eye s/p L head/neck/shoulder injury, w/ acute onset of paresthesias x 4 extremities w/ hand cramping, resolved in 10-15 minutes. Unclear etiology at this time. Would consider spontaneous idiopathic hypotension vs cerebral venous thrombosis; also would evaluate for cervical lesion (including syrinx).     Recommendations:    Disposition: CDU for imaging    Consultation: Ophthalmology consultation for blurry vision L eye (also, "chronic R eye blurry vision").    Diagnostics:  [] MRI brain without contrast   [] MR venogram brain  [] MRI cervical spine without contrast  [] CBC, CMP, coagulation panel, troponin    Medications:  [] Symptomatic treatment as per primary team - can try muscle relaxers    Miscellaneous:  [] Telemonitoring  [] Neurological checks and vital signs per unit protocol    * Case and plan not final until Attending attestation *

## 2023-10-19 NOTE — ED PROVIDER NOTE - OBJECTIVE STATEMENT
38-year-old male with no past medical history [facility]  presents to ED complaining of daily headache for 3 months and palpitations today.  Patient states he was ill 3 months ago in [place] never tested himself for COVID.  Also had a head injury when he fell 3 months ago slipping and falling backwards hitting his head on the ground.  States since then he has had daily constant headaches that is left-sided occipital pressure and tension-like associated with neck stiffness for 3 months. Had ED visit at [facility] that consisted of a CT head Noncon and a CT sinus that showed mild maxillary sinus disease.  Patient followed up with ENT and completed a 2-week course of Augmentin with minimal relief.  Patient has not had MRI imaging yet.  States recently he has developed left blurry vision only if he looks up.  Has chronic right eye poor vision for the past couple years that is at baseline.  No nausea or vomiting weakness numbness tingling.  Headaches associated with feeling lightheaded but not off balance.  States he is has been experiencing intermittent palpitations for the past week worse today more constant.  Patient was at work today when he developed severe palpitations with bilateral arm numbness and cramping so called EMS.  Numbness and cramping resolved. Denies chest pain shortness of breath cough.  Has a cardiology appointment in 4 days.  Non-smoker. This is his 4th medical visit for symptoms, had blood work checked 4 times that was normal including his thyroid.
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
## 2023-10-19 NOTE — ED ADULT NURSE NOTE - OBJECTIVE STATEMENT
Patient received to wellness, a&ox4, ambulatory. Pt reports having an "episode" lasting about 15 min of palpitations, cramping/numbness to all four extremities. Also c/o intermittent headaches x2 months. Episode today occurred while pt was at rest. RR even and unlabored. Pt with recent head CT that was WNL. pt denies chest pain, sob, n+v, headache, dizziness, vision changes, numbness, tingling at this time. Breathing even, unlabored. Pending PA order.

## 2023-10-19 NOTE — ED PROVIDER NOTE - ATTENDING APP SHARED VISIT CONTRIBUTION OF CARE
38M no significant PMH p/w HA x 3months. Pt states he's had daily posterior HA since a fall while away in July that is always present, at times worse than others. Describes as pressure-like sensation at time associated with vision changes, notably in L eye. Intermittent photophobia/phonophobia. States he has followed with his PCP who thought symptoms were due to tension headaches and also seen in ED at [facility] with CT showing sinusitis in setting of dental issues. Has not yet seen a neurologist. Denies focal weakness or sensory deficits. No n/v.   Gen: awake and alert, speaking full sentences  HEENT: EOMI, PERRL, vision 20/20 b/l eyes  CV: rrr, no appreciable murmur  Pulm: clear lungs  Abd: soft, ntnd  Ext: no edema/swelling, no gross deformity  Neuro: CN 2-12 grossly intact, motor 5/5 all extremities, sensation grossly intact, no pronator drift, negative Rhomberg  MDM: 38M no significant PMH p/w HA x 3months with intermittent L vision changes - ddx includes tension headaches vs atypical migraines vs less likely venous sinus thrombosis. Vision issues have improved with blue-light glasses per patient. CTH reviewed from [facility] in Sept and was unremarkable for acute intracranial pathology. No focal deficits noted on neuro exam. Does have significant tension in musculature when moving neck side to side. Will give supportive measures with reglan and flexeril. Will consult neuro for further recs, anticipate likely CDU for MRI.

## 2023-10-19 NOTE — ED PROVIDER NOTE - CLINICAL SUMMARY MEDICAL DECISION MAKING FREE TEXT BOX
38-year-old male with no past medical history [facility]  presents to ED complaining of daily headache for 3 months and palpitations today.  Patient states he was ill 3 months ago in [place] never tested himself for COVID.  Also had a head injury when he fell 3 months ago slipping and falling backwards hitting his head on the ground.  States since then he has had daily constant headaches that is left-sided occipital pressure and tension-like associated with neck stiffness for 3 months. Had ED visit at [facility] that consisted of a CT head Noncon and a CT sinus that showed mild maxillary sinus disease.  Patient followed up with ENT and completed a 2-week course of Augmentin with minimal relief.  Patient has not had MRI imaging yet.  States recently he has developed left blurry vision only if he looks up.  Has chronic right eye poor vision for the past couple years that is at baseline.  No nausea or vomiting weakness numbness tingling.  Headaches associated with feeling lightheaded but not off balance.  States he is has been experiencing intermittent palpitations for the past week worse today more constant.  Patient was at work today when he developed severe palpitations with bilateral arm numbness and cramping so called EMS.  Numbness and cramping resolved. Denies chest pain shortness of breath cough.  Has a cardiology appointment in 4 days.  Non-smoker. This is his 4th medical visit for symptoms, had blood work checked 4 times that was normal including his thyroid. pt well appearing, mildly anxious, no midline spinal ttp. no neuro deficits. vision 20/20 in left eye. no visual field cut. plan to consult neuro, check labs including troponin, tsh, ekg wnl.

## 2023-10-19 NOTE — ED PROVIDER NOTE - EYES, MLM
Clear bilaterally, pupils equal, round and reactive to light. Vision 20/20 b/l and 20/20 in left eye.

## 2023-10-19 NOTE — ED ADULT TRIAGE NOTE - CHIEF COMPLAINT QUOTE
Pt arrives via EMS from work, endorsed episode lasting 15 min of palpitations, cramping/numbness to all four extremities. Also c/o intermittent headaches x2 months. Episode today occurred while pt was at rest. RR even and unlabored. Pt with recent head CT that was WNL. States he's now back at baseline. Denies PMHx.

## 2023-10-19 NOTE — ED CDU PROVIDER INITIAL DAY NOTE - DETAILS
Tele monitoring, neuro checks, MRI / recommendations as per Neuro team following patient; supportive care, general observation care / monitoring.

## 2023-10-19 NOTE — ED ADULT NURSE NOTE - NSFALLUNIVINTERV_ED_ALL_ED
Bed/Stretcher in lowest position, wheels locked, appropriate side rails in place/Call bell, personal items and telephone in reach/Instruct patient to call for assistance before getting out of bed/chair/stretcher/Non-slip footwear applied when patient is off stretcher/[place] to call system/Physically safe environment - no spills, clutter or unnecessary equipment/Purposeful proactive rounding/Room/bathroom lighting operational, light cord in reach

## 2023-10-19 NOTE — CONSULT NOTE ADULT - ATTENDING COMMENTS
Fall three months ago with head trauma with subsequent concentration difficulties for a month afterwards.  Left sided neck pain and pressure in the head - not a headache.   Blurred vision in the left eye for one month and right eye for one year.   Episode of paresthesias in the body a/w palpitations.     Exam:  Visual acuity - 20/20 OU.  VFF to CF. Fundus - discs sharp  with normal color.  No RAPD.  Reflexes: arms 2; legs 3.  Toes downgoing. No Kaleb's sign.   Motor: 5/5 throughout.  Gait- normal heel, toe, tandem.      < from: MR Venogram Head w/ IV Cont (10.20.23 @ 13:12) >    MRV HEAD: The superior, inferior, straight, confluence of, transverse,   and sigmoid sinuses are patent. The right transverse sinus is dominant   compared to the left. There is no evidence of venous sinus thrombosis or   stenosis.    The internalcerebral veins, vein of Mirza, and internal jugular veins   enhance with contrast.    There is no evidence of cavernous sinus thrombosis.    IMPRESSION: Grossly unremarkable studies.    < end of copied text >    < from: MR Cervical Spine No Cont (10.20.23 @ 13:14) >    IMPRESSION: Mild degenerative changes     < end of copied text >        A/P  Mr. Omer is a 39 yo man with several issues:  Concussion 3 months ago - now resolved but with persistent musculoskeletal neck pain and tension type headache.   Subjective c/o blurred vision with normal visual acuity.  F/U outpatient with neurology and ophthalmology.   Thank you

## 2023-10-19 NOTE — ED CDU PROVIDER INITIAL DAY NOTE - OBJECTIVE STATEMENT
38-year-old male with no past medical history [place]  presents to ED complaining of daily headache for 3 months and palpitations today.  Patient states he was ill 3 months ago in [place] never tested himself for COVID.  Also had a head injury when he fell 3 months ago slipping and falling backwards hitting his head on the ground.  States since then he has had daily constant headaches that is left-sided occipital pressure and tension-like associated with neck stiffness for 3 months. Had ED visit at [facility] that consisted of a CT head Noncon and a CT sinus that showed mild maxillary sinus disease.  Patient followed up with ENT and completed a 2-week course of Augmentin with minimal relief.  Patient has not had MRI imaging yet.  States recently he has developed left blurry vision only if he looks up.  Has chronic right eye poor vision for the past couple years that is at baseline.  No nausea or vomiting weakness numbness tingling.  Headaches associated with feeling lightheaded but not off balance.  States he is has been experiencing intermittent palpitations for the past week worse today more constant.  Patient was at work today when he developed severe palpitations with bilateral arm numbness and cramping so called EMS.  Numbness and cramping resolved. Denies chest pain shortness of breath cough.  Has a cardiology appointment in 4 days.  Non-smoker. This is his 4th medical visit for symptoms, had blood work checked 4 times that was normal including his thyroid.    CDU DAISY Florez Note----  ED Provider HPI as above, reviewed / agreed to.  In the ED, WBC 13.10, CMP with no actionable findings.  TSH WNL.  Neurology was consulted; MRI advised; additional recommendations appreciated; pt was sent to CDU for continued care.

## 2023-10-19 NOTE — ED PROVIDER NOTE - NSICDXNOPASTMEDICALHX_GEN_ALL_ED
[facility] COORDINATION REFERRAL    Patient was referred to Neurology for 6th nerve palsy, right    Called patient to offer an appointment with Dr. Deanna Lindquist. Briefly spoke with patient, who stated her son is not home, but she would have him call office back to schedule. Patient was provided my name and phone number for a call back. Patient's referral came in as ASAP.   <-- Click to add NO pertinent Past Medical History

## 2023-10-19 NOTE — ED CDU PROVIDER INITIAL DAY NOTE - ATTENDING APP SHARED VISIT CONTRIBUTION OF CARE
38M no significant PMH p/w HA x 3months. Pt states he's had daily posterior HA since a fall while away in July that is always present, at times worse than others. Describes as pressure-like sensation at time associated with vision changes, notably in L eye. Intermittent photophobia/phonophobia. States he has followed with his PCP who thought symptoms were due to tension headaches and also seen in ED at [facility] with CT showing sinusitis in setting of dental issues. Has not yet seen a neurologist. Denies focal weakness or sensory deficits. No n/v. Exam unremarkable with no appreciable neuro deficits or vision loss noted. In ED, labs non-actionable. Neuro consulted. Recommended CDU for MRI brain, venogram and c-spine to further workup his presenting symptoms.

## 2023-10-20 VITALS
HEART RATE: 78 BPM | RESPIRATION RATE: 18 BRPM | OXYGEN SATURATION: 99 % | SYSTOLIC BLOOD PRESSURE: 115 MMHG | TEMPERATURE: 98 F | DIASTOLIC BLOOD PRESSURE: 73 MMHG

## 2023-10-20 LAB
ALBUMIN SERPL ELPH-MCNC: 4 G/DL — SIGNIFICANT CHANGE UP (ref 3.3–5)
ALBUMIN SERPL ELPH-MCNC: 4 G/DL — SIGNIFICANT CHANGE UP (ref 3.3–5)
ALP SERPL-CCNC: 66 U/L — SIGNIFICANT CHANGE UP (ref 40–120)
ALP SERPL-CCNC: 66 U/L — SIGNIFICANT CHANGE UP (ref 40–120)
ALT FLD-CCNC: 53 U/L — HIGH (ref 4–41)
ALT FLD-CCNC: 53 U/L — HIGH (ref 4–41)
ANION GAP SERPL CALC-SCNC: 11 MMOL/L — SIGNIFICANT CHANGE UP (ref 7–14)
ANION GAP SERPL CALC-SCNC: 11 MMOL/L — SIGNIFICANT CHANGE UP (ref 7–14)
AST SERPL-CCNC: <5 U/L — SIGNIFICANT CHANGE UP (ref 4–40)
AST SERPL-CCNC: <5 U/L — SIGNIFICANT CHANGE UP (ref 4–40)
BASOPHILS # BLD AUTO: 0.04 K/UL — SIGNIFICANT CHANGE UP (ref 0–0.2)
BASOPHILS # BLD AUTO: 0.04 K/UL — SIGNIFICANT CHANGE UP (ref 0–0.2)
BASOPHILS NFR BLD AUTO: 0.5 % — SIGNIFICANT CHANGE UP (ref 0–2)
BASOPHILS NFR BLD AUTO: 0.5 % — SIGNIFICANT CHANGE UP (ref 0–2)
BILIRUB SERPL-MCNC: 0.2 MG/DL — SIGNIFICANT CHANGE UP (ref 0.2–1.2)
BILIRUB SERPL-MCNC: 0.2 MG/DL — SIGNIFICANT CHANGE UP (ref 0.2–1.2)
BUN SERPL-MCNC: 15 MG/DL — SIGNIFICANT CHANGE UP (ref 7–23)
BUN SERPL-MCNC: 15 MG/DL — SIGNIFICANT CHANGE UP (ref 7–23)
CALCIUM SERPL-MCNC: 8.9 MG/DL — SIGNIFICANT CHANGE UP (ref 8.4–10.5)
CALCIUM SERPL-MCNC: 8.9 MG/DL — SIGNIFICANT CHANGE UP (ref 8.4–10.5)
CHLORIDE SERPL-SCNC: 105 MMOL/L — SIGNIFICANT CHANGE UP (ref 98–107)
CHLORIDE SERPL-SCNC: 105 MMOL/L — SIGNIFICANT CHANGE UP (ref 98–107)
CHOLEST SERPL-MCNC: 144 MG/DL — SIGNIFICANT CHANGE UP
CHOLEST SERPL-MCNC: 144 MG/DL — SIGNIFICANT CHANGE UP
CO2 SERPL-SCNC: 26 MMOL/L — SIGNIFICANT CHANGE UP (ref 22–31)
CO2 SERPL-SCNC: 26 MMOL/L — SIGNIFICANT CHANGE UP (ref 22–31)
CREAT SERPL-MCNC: 0.86 MG/DL — SIGNIFICANT CHANGE UP (ref 0.5–1.3)
CREAT SERPL-MCNC: 0.86 MG/DL — SIGNIFICANT CHANGE UP (ref 0.5–1.3)
EGFR: 114 ML/MIN/1.73M2 — SIGNIFICANT CHANGE UP
EGFR: 114 ML/MIN/1.73M2 — SIGNIFICANT CHANGE UP
EOSINOPHIL # BLD AUTO: 0.25 K/UL — SIGNIFICANT CHANGE UP (ref 0–0.5)
EOSINOPHIL # BLD AUTO: 0.25 K/UL — SIGNIFICANT CHANGE UP (ref 0–0.5)
EOSINOPHIL NFR BLD AUTO: 3.3 % — SIGNIFICANT CHANGE UP (ref 0–6)
EOSINOPHIL NFR BLD AUTO: 3.3 % — SIGNIFICANT CHANGE UP (ref 0–6)
GLUCOSE SERPL-MCNC: 119 MG/DL — HIGH (ref 70–99)
GLUCOSE SERPL-MCNC: 119 MG/DL — HIGH (ref 70–99)
HCT VFR BLD CALC: 40.4 % — SIGNIFICANT CHANGE UP (ref 39–50)
HCT VFR BLD CALC: 40.4 % — SIGNIFICANT CHANGE UP (ref 39–50)
HDLC SERPL-MCNC: 31 MG/DL — LOW
HDLC SERPL-MCNC: 31 MG/DL — LOW
HGB BLD-MCNC: 13.6 G/DL — SIGNIFICANT CHANGE UP (ref 13–17)
HGB BLD-MCNC: 13.6 G/DL — SIGNIFICANT CHANGE UP (ref 13–17)
IANC: 4.07 K/UL — SIGNIFICANT CHANGE UP (ref 1.8–7.4)
IANC: 4.07 K/UL — SIGNIFICANT CHANGE UP (ref 1.8–7.4)
IMM GRANULOCYTES NFR BLD AUTO: 0.3 % — SIGNIFICANT CHANGE UP (ref 0–0.9)
IMM GRANULOCYTES NFR BLD AUTO: 0.3 % — SIGNIFICANT CHANGE UP (ref 0–0.9)
LIPID PNL WITH DIRECT LDL SERPL: 88 MG/DL — SIGNIFICANT CHANGE UP
LIPID PNL WITH DIRECT LDL SERPL: 88 MG/DL — SIGNIFICANT CHANGE UP
LYMPHOCYTES # BLD AUTO: 2.21 K/UL — SIGNIFICANT CHANGE UP (ref 1–3.3)
LYMPHOCYTES # BLD AUTO: 2.21 K/UL — SIGNIFICANT CHANGE UP (ref 1–3.3)
LYMPHOCYTES # BLD AUTO: 29.6 % — SIGNIFICANT CHANGE UP (ref 13–44)
LYMPHOCYTES # BLD AUTO: 29.6 % — SIGNIFICANT CHANGE UP (ref 13–44)
MCHC RBC-ENTMCNC: 30.5 PG — SIGNIFICANT CHANGE UP (ref 27–34)
MCHC RBC-ENTMCNC: 30.5 PG — SIGNIFICANT CHANGE UP (ref 27–34)
MCHC RBC-ENTMCNC: 33.7 GM/DL — SIGNIFICANT CHANGE UP (ref 32–36)
MCHC RBC-ENTMCNC: 33.7 GM/DL — SIGNIFICANT CHANGE UP (ref 32–36)
MCV RBC AUTO: 90.6 FL — SIGNIFICANT CHANGE UP (ref 80–100)
MCV RBC AUTO: 90.6 FL — SIGNIFICANT CHANGE UP (ref 80–100)
MONOCYTES # BLD AUTO: 0.88 K/UL — SIGNIFICANT CHANGE UP (ref 0–0.9)
MONOCYTES # BLD AUTO: 0.88 K/UL — SIGNIFICANT CHANGE UP (ref 0–0.9)
MONOCYTES NFR BLD AUTO: 11.8 % — SIGNIFICANT CHANGE UP (ref 2–14)
MONOCYTES NFR BLD AUTO: 11.8 % — SIGNIFICANT CHANGE UP (ref 2–14)
NEUTROPHILS # BLD AUTO: 4.07 K/UL — SIGNIFICANT CHANGE UP (ref 1.8–7.4)
NEUTROPHILS # BLD AUTO: 4.07 K/UL — SIGNIFICANT CHANGE UP (ref 1.8–7.4)
NEUTROPHILS NFR BLD AUTO: 54.5 % — SIGNIFICANT CHANGE UP (ref 43–77)
NEUTROPHILS NFR BLD AUTO: 54.5 % — SIGNIFICANT CHANGE UP (ref 43–77)
NON HDL CHOLESTEROL: 113 MG/DL — SIGNIFICANT CHANGE UP
NON HDL CHOLESTEROL: 113 MG/DL — SIGNIFICANT CHANGE UP
NRBC # BLD: 0 /100 WBCS — SIGNIFICANT CHANGE UP (ref 0–0)
NRBC # BLD: 0 /100 WBCS — SIGNIFICANT CHANGE UP (ref 0–0)
NRBC # FLD: 0 K/UL — SIGNIFICANT CHANGE UP (ref 0–0)
NRBC # FLD: 0 K/UL — SIGNIFICANT CHANGE UP (ref 0–0)
PLATELET # BLD AUTO: 301 K/UL — SIGNIFICANT CHANGE UP (ref 150–400)
PLATELET # BLD AUTO: 301 K/UL — SIGNIFICANT CHANGE UP (ref 150–400)
POTASSIUM SERPL-MCNC: 3.5 MMOL/L — SIGNIFICANT CHANGE UP (ref 3.5–5.3)
POTASSIUM SERPL-MCNC: 3.5 MMOL/L — SIGNIFICANT CHANGE UP (ref 3.5–5.3)
POTASSIUM SERPL-SCNC: 3.5 MMOL/L — SIGNIFICANT CHANGE UP (ref 3.5–5.3)
POTASSIUM SERPL-SCNC: 3.5 MMOL/L — SIGNIFICANT CHANGE UP (ref 3.5–5.3)
PROT SERPL-MCNC: 6.4 G/DL — SIGNIFICANT CHANGE UP (ref 6–8.3)
PROT SERPL-MCNC: 6.4 G/DL — SIGNIFICANT CHANGE UP (ref 6–8.3)
RBC # BLD: 4.46 M/UL — SIGNIFICANT CHANGE UP (ref 4.2–5.8)
RBC # BLD: 4.46 M/UL — SIGNIFICANT CHANGE UP (ref 4.2–5.8)
RBC # FLD: 11.6 % — SIGNIFICANT CHANGE UP (ref 10.3–14.5)
RBC # FLD: 11.6 % — SIGNIFICANT CHANGE UP (ref 10.3–14.5)
SODIUM SERPL-SCNC: 142 MMOL/L — SIGNIFICANT CHANGE UP (ref 135–145)
SODIUM SERPL-SCNC: 142 MMOL/L — SIGNIFICANT CHANGE UP (ref 135–145)
TRIGL SERPL-MCNC: 124 MG/DL — SIGNIFICANT CHANGE UP
TRIGL SERPL-MCNC: 124 MG/DL — SIGNIFICANT CHANGE UP
WBC # BLD: 7.47 K/UL — SIGNIFICANT CHANGE UP (ref 3.8–10.5)
WBC # BLD: 7.47 K/UL — SIGNIFICANT CHANGE UP (ref 3.8–10.5)
WBC # FLD AUTO: 7.47 K/UL — SIGNIFICANT CHANGE UP (ref 3.8–10.5)
WBC # FLD AUTO: 7.47 K/UL — SIGNIFICANT CHANGE UP (ref 3.8–10.5)

## 2023-10-20 PROCEDURE — 99238 HOSP IP/OBS DSCHRG MGMT 30/<: CPT

## 2023-10-20 PROCEDURE — G1004: CPT

## 2023-10-20 PROCEDURE — 72141 MRI NECK SPINE W/O DYE: CPT | Mod: 26,MG

## 2023-10-20 PROCEDURE — 70551 MRI BRAIN STEM W/O DYE: CPT | Mod: 26,MG

## 2023-10-20 PROCEDURE — 99283 EMERGENCY DEPT VISIT LOW MDM: CPT

## 2023-10-20 NOTE — ED CDU PROVIDER DISPOSITION NOTE - PATIENT PORTAL LINK FT
You can access the FollowMyHealth Patient Portal offered by [facility] by registering at the following website: [email or web address]. By joining [employer]’s FollowMyHealth portal, you will also be able to view your health information using other applications (apps) compatible with our system.

## 2023-10-20 NOTE — ED CDU PROVIDER DISPOSITION NOTE - CLINICAL COURSE
DAISY Herrera:  37 y/o male with no significant past medical history presented to the ER complaining of 3 months of intermittent headache and blurred vision.  Patient seen and evaluated by neurology advised to obtain MRI.  MRI imaging reviewed by neurology discussed with Dr. Orellana patient ok for discharge home follow-up with Dr. Isabel.  Patient feels well, ambulate without difficulty, discharge and results reviewed with patient.

## 2023-10-20 NOTE — ED CDU PROVIDER SUBSEQUENT DAY NOTE - HISTORY
39 yo male no stated PMH, presented to ED c/o daily headaches x 3 months and palpitations intermittently x 1 week.  Patient states he was ill 3 months ago in [place] unclear from what; states was never tested for COVID.  Also had a head injury when he fell 3 months ago slipping and falling backwards hitting his head on the ground.  Since then, pt has had daily headaches described as left-sided occipital pressure and tension-like associated with neck stiffness x 3 months. Had ED visit at [facility] that consisted of a CT head Noncon and a CT sinus that showed mild maxillary sinus disease.  Patient followed with ENT and completed a 2-week course of Augmentin with minimal relief.  Patient has not had MRI imaging yet.  States recently developed left blurry vision only if he looks up.  Has chronic right eye poor vision for the past couple years that is at baseline.  No nausea or vomiting weakness numbness tingling.  Headaches associated with feeling lightheaded but not off balance.  States he is has been experiencing intermittent palpitations for the past week worse today more constant.  Patient was at work today when he developed severe palpitations with bilateral arm numbness and cramping so called EMS.  Numbness and cramping resolved. Denies chest pain shortness of breath cough.  Has a cardiology appointment in 4 days.  Non-smoker. This is his 4th medical visit for symptoms, had blood work checked 4 times that was normal including his thyroid.  In the ED, WBC 13.10, CMP with no actionable findings.  TSH WNL.  Neurology was consulted; MRI advised; additional recommendations appreciated; pt was sent to CDU for continued care.   In the interim, pt objectively noted to be resting comfortably; pt has been clinically stable; no issues thus far.

## 2023-10-20 NOTE — ED CDU PROVIDER DISPOSITION NOTE - ATTENDING CONTRIBUTION TO CARE
38 year old with headache. in cdu for mri and neuro cx. mri's grossly unremarkable. follow up with neuro as outpatient for further testing

## 2023-10-20 NOTE — ED CDU PROVIDER DISPOSITION NOTE - CARE PROVIDER_API CALL
Rajesh Isabel)  Neurology; Neurophysiology  [address], [address]  [place] [address]  Phone: [phone]  Fax: [phone]  Follow Up Time:

## 2023-10-20 NOTE — ED ADULT NURSE REASSESSMENT NOTE - NS ED NURSE REASSESS COMMENT FT1
pt aox4- returns from MRI, lunch tray given. respirations even and unlabored. pt has no complaints at this time. pending results and dispo. stretcher in lowest position, will continue to monitor.

## 2023-10-20 NOTE — ED CDU PROVIDER DISPOSITION NOTE - WR ORDER NAME 1
Call returned to Rebeca. Rebeca discussed with family and pt. They would like hospice order to be placed and hold off on Pleurx catheter at this time.     Dr. Roberson should I place order?   Xray Chest 2 Views PA/Lat

## 2023-10-20 NOTE — ED CDU PROVIDER DISPOSITION NOTE - NSFOLLOWUPINSTRUCTIONS_ED_ALL_ED_FT
Follow up with your Primary Medical Doctor in 1-2 days.  Follow up with Neurology Dr Isabel in 1-2 days or see attached list.  Follow with Spine specialist in 1-2 days see attached list.  Follow up with Opthalmology in 1-2 days call to schedule an appointment [phone].   Rest,  stay well hydrated.  Return to the ER for any persistent/worsening or new symptoms weakness, dizziness, chest pain, shortness of breath, numbness, tingling or any concerning symptoms.

## 2023-10-20 NOTE — CHART NOTE - NSCHARTNOTEFT_GEN_A_CORE
Case discussed and imaging reviewed with general neurology attending Dr. Angelina Velasquez (see attending attestation to neurology consult note). Ok for patient to f/u outpatient with ophthalmology and neurology (if needs Neurologist, can set up appointment with Dr. Rajesh Isabel [phone]. Case discussed and imaging reviewed with general neurology attending Dr. Angelina Velasquez (see attending attestation to neurology consult note). Ok for patient to f/u outpatient with ophthalmology and neurology (if needs Neurologist, can set up appointment with Dr. Rajesh Isabel [phone].  Thank you

## 2023-10-20 NOTE — ED CDU PROVIDER SUBSEQUENT DAY NOTE - NS ED ATTENDING STATEMENT MOD
This was a shared visit with the LU[facility]. I reviewed and verified the documentation and independently performed the documented:

## 2023-11-06 PROBLEM — Z78.9 OTHER SPECIFIED HEALTH STATUS: Chronic | Status: ACTIVE | Noted: 2023-10-19

## 2023-11-15 ENCOUNTER — APPOINTMENT (OUTPATIENT)
Dept: MRI IMAGING | Facility: CLINIC | Age: 38
End: 2023-11-15
Payer: COMMERCIAL

## 2023-11-15 ENCOUNTER — OUTPATIENT (OUTPATIENT)
Dept: OUTPATIENT SERVICES | Facility: HOSPITAL | Age: 38
LOS: 1 days | End: 2023-11-15
Payer: COMMERCIAL

## 2023-11-15 DIAGNOSIS — Z00.8 ENCOUNTER FOR OTHER GENERAL EXAMINATION: ICD-10-CM

## 2023-11-15 PROCEDURE — 72146 MRI CHEST SPINE W/O DYE: CPT | Mod: 26

## 2023-11-15 PROCEDURE — 70547 MR ANGIOGRAPHY NECK W/O DYE: CPT | Mod: 26

## 2023-11-15 PROCEDURE — 72146 MRI CHEST SPINE W/O DYE: CPT

## 2023-11-15 PROCEDURE — 70547 MR ANGIOGRAPHY NECK W/O DYE: CPT

## 2024-01-05 NOTE — ED ADULT NURSE NOTE - PAIN RATING/NUMBER SCALE (0-10): ACTIVITY
Contacted patient with results, verbalize understanding. No questions at this time.    5 (moderate pain)

## 2024-02-07 ENCOUNTER — APPOINTMENT (OUTPATIENT)
Dept: MRI IMAGING | Facility: CLINIC | Age: 39
End: 2024-02-07
Payer: COMMERCIAL

## 2024-02-07 ENCOUNTER — OUTPATIENT (OUTPATIENT)
Dept: OUTPATIENT SERVICES | Facility: HOSPITAL | Age: 39
LOS: 1 days | End: 2024-02-07
Payer: COMMERCIAL

## 2024-02-07 DIAGNOSIS — R20.2 PARESTHESIA OF SKIN: ICD-10-CM

## 2024-02-07 DIAGNOSIS — R51.9 HEADACHE, UNSPECIFIED: ICD-10-CM

## 2024-02-07 DIAGNOSIS — Z00.8 ENCOUNTER FOR OTHER GENERAL EXAMINATION: ICD-10-CM

## 2024-02-07 PROCEDURE — 72148 MRI LUMBAR SPINE W/O DYE: CPT

## 2024-02-07 PROCEDURE — 72148 MRI LUMBAR SPINE W/O DYE: CPT | Mod: 26

## 2024-10-20 ENCOUNTER — INPATIENT (INPATIENT)
Facility: HOSPITAL | Age: 39
LOS: 0 days | Discharge: HOME | DRG: 395 | End: 2024-10-21
Attending: TRANSPLANT SURGERY | Admitting: TRANSPLANT SURGERY
Payer: COMMERCIAL

## 2024-10-20 ENCOUNTER — TRANSCRIPTION ENCOUNTER (OUTPATIENT)
Age: 39
End: 2024-10-20

## 2024-10-20 VITALS
SYSTOLIC BLOOD PRESSURE: 146 MMHG | RESPIRATION RATE: 18 BRPM | WEIGHT: 179.9 LBS | TEMPERATURE: 98 F | HEIGHT: 69 IN | OXYGEN SATURATION: 98 % | HEART RATE: 90 BPM | DIASTOLIC BLOOD PRESSURE: 96 MMHG

## 2024-10-20 DIAGNOSIS — K35.80 UNSPECIFIED ACUTE APPENDICITIS: ICD-10-CM

## 2024-10-20 LAB
ALBUMIN SERPL ELPH-MCNC: 4.6 G/DL — SIGNIFICANT CHANGE UP (ref 3.3–5)
ALP SERPL-CCNC: 61 U/L — SIGNIFICANT CHANGE UP (ref 40–120)
ALT FLD-CCNC: 51 U/L — HIGH (ref 10–45)
ANION GAP SERPL CALC-SCNC: 11 MMOL/L — SIGNIFICANT CHANGE UP (ref 5–17)
APPEARANCE UR: CLEAR — SIGNIFICANT CHANGE UP
APTT BLD: 33.7 SEC — SIGNIFICANT CHANGE UP (ref 24.5–35.6)
AST SERPL-CCNC: 21 U/L — SIGNIFICANT CHANGE UP (ref 10–40)
BACTERIA # UR AUTO: NEGATIVE /HPF — SIGNIFICANT CHANGE UP
BASOPHILS # BLD AUTO: 0.07 K/UL — SIGNIFICANT CHANGE UP (ref 0–0.2)
BASOPHILS NFR BLD AUTO: 0.5 % — SIGNIFICANT CHANGE UP (ref 0–2)
BILIRUB SERPL-MCNC: 0.3 MG/DL — SIGNIFICANT CHANGE UP (ref 0.2–1.2)
BILIRUB UR-MCNC: NEGATIVE — SIGNIFICANT CHANGE UP
BLD GP AB SCN SERPL QL: NEGATIVE — SIGNIFICANT CHANGE UP
BUN SERPL-MCNC: 18 MG/DL — SIGNIFICANT CHANGE UP (ref 7–23)
CALCIUM SERPL-MCNC: 9.7 MG/DL — SIGNIFICANT CHANGE UP (ref 8.4–10.5)
CAST: 0 /LPF — SIGNIFICANT CHANGE UP (ref 0–4)
CHLORIDE SERPL-SCNC: 103 MMOL/L — SIGNIFICANT CHANGE UP (ref 96–108)
CO2 SERPL-SCNC: 25 MMOL/L — SIGNIFICANT CHANGE UP (ref 22–31)
COLOR SPEC: YELLOW — SIGNIFICANT CHANGE UP
CREAT SERPL-MCNC: 0.87 MG/DL — SIGNIFICANT CHANGE UP (ref 0.5–1.3)
DIFF PNL FLD: NEGATIVE — SIGNIFICANT CHANGE UP
EGFR: 113 ML/MIN/1.73M2 — SIGNIFICANT CHANGE UP
EOSINOPHIL # BLD AUTO: 0.29 K/UL — SIGNIFICANT CHANGE UP (ref 0–0.5)
EOSINOPHIL NFR BLD AUTO: 2.1 % — SIGNIFICANT CHANGE UP (ref 0–6)
GLUCOSE SERPL-MCNC: 104 MG/DL — HIGH (ref 70–99)
GLUCOSE UR QL: NEGATIVE MG/DL — SIGNIFICANT CHANGE UP
HCT VFR BLD CALC: 44 % — SIGNIFICANT CHANGE UP (ref 39–50)
HGB BLD-MCNC: 14.9 G/DL — SIGNIFICANT CHANGE UP (ref 13–17)
IMM GRANULOCYTES NFR BLD AUTO: 0.6 % — SIGNIFICANT CHANGE UP (ref 0–0.9)
INR BLD: 1.05 RATIO — SIGNIFICANT CHANGE UP (ref 0.85–1.16)
KETONES UR-MCNC: NEGATIVE MG/DL — SIGNIFICANT CHANGE UP
LEUKOCYTE ESTERASE UR-ACNC: NEGATIVE — SIGNIFICANT CHANGE UP
LIDOCAIN IGE QN: 19 U/L — SIGNIFICANT CHANGE UP (ref 7–60)
LYMPHOCYTES # BLD AUTO: 1.94 K/UL — SIGNIFICANT CHANGE UP (ref 1–3.3)
LYMPHOCYTES # BLD AUTO: 14.2 % — SIGNIFICANT CHANGE UP (ref 13–44)
MCHC RBC-ENTMCNC: 30.4 PG — SIGNIFICANT CHANGE UP (ref 27–34)
MCHC RBC-ENTMCNC: 33.9 GM/DL — SIGNIFICANT CHANGE UP (ref 32–36)
MCV RBC AUTO: 89.8 FL — SIGNIFICANT CHANGE UP (ref 80–100)
MONOCYTES # BLD AUTO: 1.04 K/UL — HIGH (ref 0–0.9)
MONOCYTES NFR BLD AUTO: 7.6 % — SIGNIFICANT CHANGE UP (ref 2–14)
NEUTROPHILS # BLD AUTO: 10.22 K/UL — HIGH (ref 1.8–7.4)
NEUTROPHILS NFR BLD AUTO: 75 % — SIGNIFICANT CHANGE UP (ref 43–77)
NITRITE UR-MCNC: NEGATIVE — SIGNIFICANT CHANGE UP
NRBC # BLD: 0 /100 WBCS — SIGNIFICANT CHANGE UP (ref 0–0)
PH UR: 7 — SIGNIFICANT CHANGE UP (ref 5–8)
PLATELET # BLD AUTO: 283 K/UL — SIGNIFICANT CHANGE UP (ref 150–400)
POTASSIUM SERPL-MCNC: 4.5 MMOL/L — SIGNIFICANT CHANGE UP (ref 3.5–5.3)
POTASSIUM SERPL-SCNC: 4.5 MMOL/L — SIGNIFICANT CHANGE UP (ref 3.5–5.3)
PROT SERPL-MCNC: 7.5 G/DL — SIGNIFICANT CHANGE UP (ref 6–8.3)
PROT UR-MCNC: NEGATIVE MG/DL — SIGNIFICANT CHANGE UP
PROTHROM AB SERPL-ACNC: 12 SEC — SIGNIFICANT CHANGE UP (ref 9.9–13.4)
RBC # BLD: 4.9 M/UL — SIGNIFICANT CHANGE UP (ref 4.2–5.8)
RBC # FLD: 11.5 % — SIGNIFICANT CHANGE UP (ref 10.3–14.5)
RBC CASTS # UR COMP ASSIST: 2 /HPF — SIGNIFICANT CHANGE UP (ref 0–4)
RH IG SCN BLD-IMP: POSITIVE — SIGNIFICANT CHANGE UP
SODIUM SERPL-SCNC: 139 MMOL/L — SIGNIFICANT CHANGE UP (ref 135–145)
SP GR SPEC: >1.03 — HIGH (ref 1–1.03)
SQUAMOUS # UR AUTO: 0 /HPF — SIGNIFICANT CHANGE UP (ref 0–5)
UROBILINOGEN FLD QL: 0.2 MG/DL — SIGNIFICANT CHANGE UP (ref 0.2–1)
WBC # BLD: 13.64 K/UL — HIGH (ref 3.8–10.5)
WBC # FLD AUTO: 13.64 K/UL — HIGH (ref 3.8–10.5)
WBC UR QL: 0 /HPF — SIGNIFICANT CHANGE UP (ref 0–5)

## 2024-10-20 PROCEDURE — 99223 1ST HOSP IP/OBS HIGH 75: CPT | Mod: 57

## 2024-10-20 PROCEDURE — 44970 LAPAROSCOPY APPENDECTOMY: CPT

## 2024-10-20 PROCEDURE — 74177 CT ABD & PELVIS W/CONTRAST: CPT | Mod: 26,MC

## 2024-10-20 PROCEDURE — 88304 TISSUE EXAM BY PATHOLOGIST: CPT | Mod: 26

## 2024-10-20 PROCEDURE — 76770 US EXAM ABDO BACK WALL COMP: CPT | Mod: 26

## 2024-10-20 PROCEDURE — 99285 EMERGENCY DEPT VISIT HI MDM: CPT

## 2024-10-20 DEVICE — STAPLER COVIDIEN TRI-STAPLE 45MM PURPLE RELOAD: Type: IMPLANTABLE DEVICE | Status: FUNCTIONAL

## 2024-10-20 RX ORDER — METRONIDAZOLE 250 MG/1
500 TABLET ORAL ONCE
Refills: 0 | Status: COMPLETED | OUTPATIENT
Start: 2024-10-20 | End: 2024-10-20

## 2024-10-20 RX ORDER — ENOXAPARIN SODIUM 40MG/0.4ML
40 SYRINGE (ML) SUBCUTANEOUS EVERY 24 HOURS
Refills: 0 | Status: DISCONTINUED | OUTPATIENT
Start: 2024-10-20 | End: 2024-10-20

## 2024-10-20 RX ORDER — PANTOPRAZOLE SODIUM 40 MG/1
40 TABLET, DELAYED RELEASE ORAL ONCE
Refills: 0 | Status: COMPLETED | OUTPATIENT
Start: 2024-10-20 | End: 2024-10-20

## 2024-10-20 RX ORDER — SODIUM CHLORIDE 9 MG/ML
1000 INJECTION, SOLUTION INTRAMUSCULAR; INTRAVENOUS; SUBCUTANEOUS ONCE
Refills: 0 | Status: COMPLETED | OUTPATIENT
Start: 2024-10-20 | End: 2024-10-20

## 2024-10-20 RX ORDER — HYDROMORPHONE HCL/0.9% NACL/PF 6 MG/30 ML
0.5 PATIENT CONTROLLED ANALGESIA SYRINGE INTRAVENOUS
Refills: 0 | Status: DISCONTINUED | OUTPATIENT
Start: 2024-10-20 | End: 2024-10-21

## 2024-10-20 RX ORDER — CEFTRIAXONE SODIUM 10 G
1000 VIAL (EA) INJECTION EVERY 24 HOURS
Refills: 0 | Status: CANCELLED | OUTPATIENT
Start: 2024-10-21 | End: 2024-10-20

## 2024-10-20 RX ORDER — ACETAMINOPHEN 500 MG
1000 TABLET ORAL EVERY 6 HOURS
Refills: 0 | Status: DISCONTINUED | OUTPATIENT
Start: 2024-10-20 | End: 2024-10-20

## 2024-10-20 RX ORDER — METRONIDAZOLE 250 MG/1
500 TABLET ORAL EVERY 12 HOURS
Refills: 0 | Status: DISCONTINUED | OUTPATIENT
Start: 2024-10-20 | End: 2024-10-20

## 2024-10-20 RX ORDER — ONDANSETRON HYDROCHLORIDE 2 MG/ML
4 INJECTION, SOLUTION INTRAMUSCULAR; INTRAVENOUS ONCE
Refills: 0 | Status: DISCONTINUED | OUTPATIENT
Start: 2024-10-20 | End: 2024-10-21

## 2024-10-20 RX ORDER — ACETAMINOPHEN 500 MG
1000 TABLET ORAL ONCE
Refills: 0 | Status: COMPLETED | OUTPATIENT
Start: 2024-10-20 | End: 2024-10-20

## 2024-10-20 RX ORDER — HYDROMORPHONE HCL/0.9% NACL/PF 6 MG/30 ML
1 PATIENT CONTROLLED ANALGESIA SYRINGE INTRAVENOUS
Refills: 0 | Status: DISCONTINUED | OUTPATIENT
Start: 2024-10-20 | End: 2024-10-21

## 2024-10-20 RX ORDER — FAMOTIDINE 10 MG/ML
20 INJECTION INTRAVENOUS ONCE
Refills: 0 | Status: COMPLETED | OUTPATIENT
Start: 2024-10-20 | End: 2024-10-20

## 2024-10-20 RX ORDER — SUCRALFATE 1 G/10ML
1 SUSPENSION ORAL ONCE
Refills: 0 | Status: COMPLETED | OUTPATIENT
Start: 2024-10-20 | End: 2024-10-20

## 2024-10-20 RX ORDER — CEFTRIAXONE SODIUM 10 G
1000 VIAL (EA) INJECTION ONCE
Refills: 0 | Status: COMPLETED | OUTPATIENT
Start: 2024-10-20 | End: 2024-10-20

## 2024-10-20 RX ORDER — INFLUENZ VIR VAC TV P-SURF2003 15MCG/.5ML
0.5 SYRINGE (ML) INTRAMUSCULAR ONCE
Refills: 0 | Status: DISCONTINUED | OUTPATIENT
Start: 2024-10-20 | End: 2024-10-21

## 2024-10-20 RX ADMIN — PANTOPRAZOLE SODIUM 40 MILLIGRAM(S): 40 TABLET, DELAYED RELEASE ORAL at 08:48

## 2024-10-20 RX ADMIN — FAMOTIDINE 20 MILLIGRAM(S): 10 INJECTION INTRAVENOUS at 08:47

## 2024-10-20 RX ADMIN — Medication 400 MILLIGRAM(S): at 08:47

## 2024-10-20 RX ADMIN — SUCRALFATE 1 GRAM(S): 1 SUSPENSION ORAL at 08:48

## 2024-10-20 RX ADMIN — Medication 100 MILLIGRAM(S): at 10:58

## 2024-10-20 RX ADMIN — SODIUM CHLORIDE 1000 MILLILITER(S): 9 INJECTION, SOLUTION INTRAMUSCULAR; INTRAVENOUS; SUBCUTANEOUS at 08:47

## 2024-10-20 RX ADMIN — METRONIDAZOLE 100 MILLIGRAM(S): 250 TABLET ORAL at 11:13

## 2024-10-20 NOTE — H&P ADULT - NSHPSOCIALHISTORY_GEN_ALL_CORE
-- DO NOT REPLY / DO NOT REPLY ALL --  -- Message is from the [facility]--    Contacted Ms. Roche regarding follow-up for Acute hypoxic respiratory failure due to COVID-19 pneumonia  after hospital discharge. She was discharged from the hospital on 12/27/21. Review of the Discharge Summary / After Visit Summary from the recent hospitalization indicates that the patient needs to f/u with PCP.    She feels that she is doing well at home.   Her diet concern is none. Overall, the patient is eating well.   Ambulation: staying the same  Fever: is not present  Pain: none  Activities of Daily Living (global): Self-care   Patient states that she does have sufficient family support. She feels that she is able to ask for assistance when needed. Lives with family.    Additional patient/family concerns: Patient needs a letter to state she finished the treatment and quarantine for Covid. She is requesting the letter to travel and to be sent by email to [email or web address] by Friday.    Discharge medications were reviewed and reconciled with the patient.      She is fully compliant with the medication regimen prescribed at the time of discharge. She reports that she is not experiencing any medication side effects.    Upon discharge, the patient was to receive no additional services.     Patient needs a PHF with Dr. Paez.  Ms. Roche was reminded about the importance of keeping this appointment.    
She was admitted to the hospital on 12/20/21.  When was her positive COVID test?  A 10 day isolation period is needed, and if the test was done on 12/20, she will not be at the 10 day point until 12/30/21.  I will be happy to send a letter via her portal at the 10 day point.  
Spoke to patient regarding letter needed to travel. Patient understood that 12/30/2021 is when she will receive the letter.   
Employed as a , brother at bedside is a . Consumes 0-3 alcoholic drinks per week (typically 0, 1-3 if going out with friends) and 6-7 3mg Zyn nicotine pouches daily. Denies tobacco, vape, or recreational drug use.

## 2024-10-20 NOTE — ED PROVIDER NOTE - CLINICAL SUMMARY MEDICAL DECISION MAKING FREE TEXT BOX
Attending Margarette Lemus: 39-year-old male without significant past medical issues presenting with concern for abdominal pain.  Patient states has been having intermittent episodes of abdominal pain.  Has seen a GI doctor and had a colonoscopy without any clear etiology of patient's symptoms.  Patient also states had an MRI of his abdomen in August without any clear etiology of his symptoms.  Patient states has having intermittent abdominal pain worse today and stabbing.  Pain located in the lower part of his abdomen.  Patient currently denies any testicle pain or dysuria.  No recent falls or any trauma.  No new medications.  No sick contacts.  No family history of any medical issues.  Upon arrival patient is awake alert following commands.  Patient hemodynamically stable.  No sore throats oropharynx is pink.  Abdomen is soft and nontender on exam.  Point-of-care ultrasound performed showing no evidence of hydronephrosis making ureteral stone less likely.  No pulsatile mass and no known history of vasculitis or allergic disease making AAA less likely.  Patient's extremities are warm and well-perfused.  No joint pain or swelling.  Possible mild colitis versus diverticulitis.  No associated headaches or lightheadedness and with normal MRI less likely pheochromocytoma.  Will obtain labs, CT scan of the abdomen pelvis and reevaluate. Attending Margarette Lemus: 39-year-old male without significant past medical issues presenting with concern for abdominal pain.  Patient states has been having intermittent episodes of abdominal pain.  Has seen a GI doctor and had a colonoscopy without any clear etiology of patient's symptoms.  Patient also states had an MRI of his abdomen in August without any clear etiology of his symptoms.  Patient states has having intermittent abdominal pain worse today and stabbing.  Pain located in the lower part of his abdomen.  Patient currently denies any testicle pain or dysuria.  No recent falls or any trauma.  No new medications.  No sick contacts.  No family history of any medical issues.  Upon arrival patient is awake alert following commands.  Patient hemodynamically stable.  No sore throats oropharynx is pink.  Abdomen is soft andwith mild ttp LLQ and RLQ.  Point-of-care ultrasound performed showing no evidence of hydronephrosis making ureteral stone less likely.  No pulsatile mass and no known history of vasculitis or allergic disease making AAA less likely.  Patient's extremities are warm and well-perfused.  No joint pain or swelling.  Possible mild colitis versus diverticulitis.  No associated headaches or lightheadedness and with normal MRI less likely pheochromocytoma. With RLQ pain consider appendicitis as well Will obtain labs, CT scan of the abdomen pelvis and reevaluate.

## 2024-10-20 NOTE — H&P ADULT - ASSESSMENT
Mr. Omer is a healthy 39 year old man presenting with one day of lower abdominal pain with laboratory studies and imaging consistent with acute uncomplicated appendicitis.     Plan  - Admit to acute care surgery, Dr. Eugenio Rueda.   - NPO for surgery, LR at maintenance rate.   - Added on and consented for laparoscopic appendectomy, possible open.   - Perioperative antibiotic treatment with ceftriaxone and metronidazole.   - Low molecular weight heparin and SCD for DVT prophylaxis.   - No intervention at this time for patient's reported history of chronic bloating. Symptoms as described sound consistent with irritable bowel syndrome. Can consider gastroenterology consult if remains inpatient for a prolonged period post-operatively but this complaint seems unrelated to the present bacterial infection for which operative source control is indicated.     Case discussed with attending surgeon Dr. Rueda.     Jack Leyva, PGY-2  Acute Care Surgery ([id number])

## 2024-10-20 NOTE — ED ADULT NURSE REASSESSMENT NOTE - NS ED NURSE REASSESS COMMENT FT1
Report given to Sravani RIOS in ED Holding [facility]. Pt reports pain/discomfort relief from medications. Friend at bedside.

## 2024-10-20 NOTE — ED PROVIDER NOTE - PROGRESS NOTE DETAILS
Attending Margarette Lemus: CT scan concerning for possible acute appendicitis.  Patient on repeat exam does have some mild right lower quadrant pain.  Discussed with patient results and discussed with surgery.

## 2024-10-20 NOTE — ED PROVIDER NOTE - ATTENDING APP SHARED VISIT CONTRIBUTION OF CARE
Attending MD Margarette Lemus:  I personally made/approved the management plan and take responsibility for the patient management.  Physician assistant note reviewed and agree on plan of care and except where noted.  See HPI, PE, and MDM for details.

## 2024-10-20 NOTE — ED ADULT NURSE NOTE - NSFALLUNIVINTERV_ED_ALL_ED
Bed/Stretcher in lowest position, wheels locked, appropriate side rails in place/Call bell, personal items and telephone in reach/Instruct patient to call for assistance before getting out of bed/chair/stretcher/Non-slip footwear applied when patient is off stretcher/[place] to call system/Physically safe environment - no spills, clutter or unnecessary equipment/Purposeful proactive rounding/Room/bathroom lighting operational, light cord in reach

## 2024-10-20 NOTE — ED PROVIDER NOTE - PHYSICAL EXAMINATION
Attending Margarette Lemus: Gen: NAD, heent: atrauamtic, eomi, perrla, mmm, op pink, uvula midline, neck; nttp, no nuchal rigidity, chest: nttp, no crepitus, cv: rrr, no murmurs, lungs: ctab, abd: soft, nontender, nondistended, no peritoneal signs, , no guarding, ext: wwp, neg homans, skin: no rash, neuro: awake and alert, following commands, speech clear, sensation and strength intact, no focal deficits Attending Margarette Lemus: Gen: NAD, heent: atrauamtic, eomi, perrla, mmm, op pink, uvula midline, neck; nttp, no nuchal rigidity, chest: nttp, no crepitus, cv: rrr, no murmurs, lungs: ctab, abd: soft, ttp LLQ and mild RLQ nondistended, no peritoneal signs, , no guarding, ext: wwp, neg homans, skin: no rash, neuro: awake and alert, following commands, speech clear, sensation and strength intact, no focal deficits

## 2024-10-20 NOTE — ED PROVIDER NOTE - OBJECTIVE STATEMENT
38 y/o male with no PMHx, no PSx now presenting to the ED with left sided burning bloating abdominal pain since 3pm yesterday. Patient reported the pain is constant and worsening despite trying prilosec, tums, and gasX. Patient reported pain is well localized and does not radiate. Patient had similar pain 3 years ago in which he had a normal colonoscopy. Patient had similar pain one month ago in which he normal MRI abdomen. Had normal BM last night. Patient denied CP, SOB, urinary symptoms, fever, vomiting,

## 2024-10-20 NOTE — ED ADULT NURSE NOTE - OBJECTIVE STATEMENT
40 y/o M coming from home complaining of abdominal pain. Pt states they've been experiencing bubbling for some weeks but last night they began to experience pain. Pt describes it as burning and intermittent. Pt also complains mild nausea. Denies chest pain, sob, vomiting, diarrhea, constipation, dysuria, chills, headache. Denies PMH. AAOx4, ambulates independently. Breathing spontaneous and unlabored. Abdomen soft, nondistended, and nontender to palpation. Skin warm, dry, and color normal for race.

## 2024-10-20 NOTE — H&P ADULT - NSHPLABSRESULTS_GEN_ALL_CORE
CBC Basic (10-20 @ 09:12)  WBC: 13.64 Hgb: 14.9 Hct: 44.0 Plt: 283    Metabolic Panel (10-20 @ 09:12)  139  |  103  |  18  ----------------------------<  104  4.5   |  25  |  0.87  Ca: 9.7/Phos: x/Magnesium: x    Liver Chemistries (10-20 @ 09:12)  Total protein 7.5 | Albumin 4.6  TBili 0.3 | DBili x  AST 21 | ALT 51 | AlkPhos 61    Coagulation Studies (10-20 @ 10:02)  PT/INR: 12.0/1.05, aPTT: 33.7    CT Abdomen and Pelvis w/ IV Cont (10-20 @ 09:12)  Appendix is distended up to 1 cm with calcified appendicolith towards the base and slight surrounding infiltration, concerning for mild acute appendicitis. Mild stool burden of the colon limits evaluation of the colonic mucosa.

## 2024-10-20 NOTE — H&P ADULT - HISTORY OF PRESENT ILLNESS
Mr. Omer is a healthy 39 year old man presenting with one day of acute onset lower abdominal pain. He describes several months of bloating and a previous history of pain in 2021 which prompted him to see a gastroenterologist, who performed a colonoscopy that was reportedly normal. While the bloating is chronic he has only had pain since 10/19, severe enough to prevent him from sleeping. He has also had mild nausea but no vomiting. He further denies associated fevers, chills, chest pain, shortness of breath, melena, hematochezia, oliguria, dysuria, or hematuria.     In the ED he is hemodynamically stable and in no acute distress. Laboratory studies are notable for leukocytosis (13.64), with the remainder of complete blood count as well as C-reactive protein, lipase, basic metabolic panel, and liver chemistries all within normal limits. Cross-sectional imaging reveals acute uncomplicated appendicitis with an appendicolith.

## 2024-10-21 ENCOUNTER — TRANSCRIPTION ENCOUNTER (OUTPATIENT)
Age: 39
End: 2024-10-21

## 2024-10-21 VITALS
OXYGEN SATURATION: 95 % | HEART RATE: 75 BPM | SYSTOLIC BLOOD PRESSURE: 102 MMHG | DIASTOLIC BLOOD PRESSURE: 62 MMHG | RESPIRATION RATE: 18 BRPM | TEMPERATURE: 98 F

## 2024-10-21 LAB
ALBUMIN SERPL ELPH-MCNC: 4.1 G/DL — SIGNIFICANT CHANGE UP (ref 3.3–5)
ALP SERPL-CCNC: 60 U/L — SIGNIFICANT CHANGE UP (ref 40–120)
ALT FLD-CCNC: 39 U/L — SIGNIFICANT CHANGE UP (ref 10–45)
ANION GAP SERPL CALC-SCNC: 10 MMOL/L — SIGNIFICANT CHANGE UP (ref 5–17)
AST SERPL-CCNC: 19 U/L — SIGNIFICANT CHANGE UP (ref 10–40)
BILIRUB DIRECT SERPL-MCNC: <0.1 MG/DL — SIGNIFICANT CHANGE UP (ref 0–0.3)
BILIRUB INDIRECT FLD-MCNC: >0.2 MG/DL — SIGNIFICANT CHANGE UP (ref 0.2–1)
BILIRUB SERPL-MCNC: 0.3 MG/DL — SIGNIFICANT CHANGE UP (ref 0.2–1.2)
BUN SERPL-MCNC: 15 MG/DL — SIGNIFICANT CHANGE UP (ref 7–23)
CALCIUM SERPL-MCNC: 9 MG/DL — SIGNIFICANT CHANGE UP (ref 8.4–10.5)
CHLORIDE SERPL-SCNC: 104 MMOL/L — SIGNIFICANT CHANGE UP (ref 96–108)
CO2 SERPL-SCNC: 22 MMOL/L — SIGNIFICANT CHANGE UP (ref 22–31)
CREAT SERPL-MCNC: 0.97 MG/DL — SIGNIFICANT CHANGE UP (ref 0.5–1.3)
EGFR: 102 ML/MIN/1.73M2 — SIGNIFICANT CHANGE UP
GLUCOSE SERPL-MCNC: 165 MG/DL — HIGH (ref 70–99)
HCT VFR BLD CALC: 39.1 % — SIGNIFICANT CHANGE UP (ref 39–50)
HGB BLD-MCNC: 13.3 G/DL — SIGNIFICANT CHANGE UP (ref 13–17)
MAGNESIUM SERPL-MCNC: 2.2 MG/DL — SIGNIFICANT CHANGE UP (ref 1.6–2.6)
MCHC RBC-ENTMCNC: 30.4 PG — SIGNIFICANT CHANGE UP (ref 27–34)
MCHC RBC-ENTMCNC: 34 GM/DL — SIGNIFICANT CHANGE UP (ref 32–36)
MCV RBC AUTO: 89.3 FL — SIGNIFICANT CHANGE UP (ref 80–100)
NRBC # BLD: 0 /100 WBCS — SIGNIFICANT CHANGE UP (ref 0–0)
PHOSPHATE SERPL-MCNC: 1.7 MG/DL — LOW (ref 2.5–4.5)
PLATELET # BLD AUTO: 267 K/UL — SIGNIFICANT CHANGE UP (ref 150–400)
POTASSIUM SERPL-MCNC: 4.1 MMOL/L — SIGNIFICANT CHANGE UP (ref 3.5–5.3)
POTASSIUM SERPL-SCNC: 4.1 MMOL/L — SIGNIFICANT CHANGE UP (ref 3.5–5.3)
PROT SERPL-MCNC: 6.7 G/DL — SIGNIFICANT CHANGE UP (ref 6–8.3)
RBC # BLD: 4.38 M/UL — SIGNIFICANT CHANGE UP (ref 4.2–5.8)
RBC # FLD: 11.5 % — SIGNIFICANT CHANGE UP (ref 10.3–14.5)
SODIUM SERPL-SCNC: 136 MMOL/L — SIGNIFICANT CHANGE UP (ref 135–145)
WBC # BLD: 9.07 K/UL — SIGNIFICANT CHANGE UP (ref 3.8–10.5)
WBC # FLD AUTO: 9.07 K/UL — SIGNIFICANT CHANGE UP (ref 3.8–10.5)

## 2024-10-21 PROCEDURE — 80048 BASIC METABOLIC PNL TOTAL CA: CPT

## 2024-10-21 PROCEDURE — 99285 EMERGENCY DEPT VISIT HI MDM: CPT

## 2024-10-21 PROCEDURE — 85027 COMPLETE CBC AUTOMATED: CPT

## 2024-10-21 PROCEDURE — 83690 ASSAY OF LIPASE: CPT

## 2024-10-21 PROCEDURE — 74177 CT ABD & PELVIS W/CONTRAST: CPT | Mod: MC

## 2024-10-21 PROCEDURE — 80053 COMPREHEN METABOLIC PANEL: CPT

## 2024-10-21 PROCEDURE — 99024 POSTOP FOLLOW-UP VISIT: CPT

## 2024-10-21 PROCEDURE — 80076 HEPATIC FUNCTION PANEL: CPT

## 2024-10-21 PROCEDURE — 85730 THROMBOPLASTIN TIME PARTIAL: CPT

## 2024-10-21 PROCEDURE — 86901 BLOOD TYPING SEROLOGIC RH(D): CPT

## 2024-10-21 PROCEDURE — 86850 RBC ANTIBODY SCREEN: CPT

## 2024-10-21 PROCEDURE — 76770 US EXAM ABDO BACK WALL COMP: CPT

## 2024-10-21 PROCEDURE — C9399: CPT

## 2024-10-21 PROCEDURE — 85025 COMPLETE CBC W/AUTO DIFF WBC: CPT

## 2024-10-21 PROCEDURE — 86900 BLOOD TYPING SEROLOGIC ABO: CPT

## 2024-10-21 PROCEDURE — 83735 ASSAY OF MAGNESIUM: CPT

## 2024-10-21 PROCEDURE — 88304 TISSUE EXAM BY PATHOLOGIST: CPT

## 2024-10-21 PROCEDURE — 84100 ASSAY OF PHOSPHORUS: CPT

## 2024-10-21 PROCEDURE — 85610 PROTHROMBIN TIME: CPT

## 2024-10-21 PROCEDURE — 96374 THER/PROPH/DIAG INJ IV PUSH: CPT

## 2024-10-21 PROCEDURE — 96375 TX/PRO/DX INJ NEW DRUG ADDON: CPT

## 2024-10-21 PROCEDURE — 86140 C-REACTIVE PROTEIN: CPT

## 2024-10-21 PROCEDURE — C1889: CPT

## 2024-10-21 PROCEDURE — 81001 URINALYSIS AUTO W/SCOPE: CPT

## 2024-10-21 PROCEDURE — 36415 COLL VENOUS BLD VENIPUNCTURE: CPT

## 2024-10-21 RX ORDER — IBUPROFEN 200 MG
600 TABLET ORAL EVERY 6 HOURS
Refills: 0 | Status: DISCONTINUED | OUTPATIENT
Start: 2024-10-21 | End: 2024-10-21

## 2024-10-21 RX ORDER — DIAZEPAM 10 MG/1
5 FILM BUCCAL
Qty: 0 | Refills: 0 | DISCHARGE
Start: 2024-10-21

## 2024-10-21 RX ORDER — OXYCODONE HYDROCHLORIDE 30 MG/1
1 TABLET ORAL
Qty: 5 | Refills: 0
Start: 2024-10-21

## 2024-10-21 RX ORDER — DIBASIC SODIUM PHOSPHATE, MONOBASIC POTASSIUM PHOSPHATE AND MONOBASIC SODIUM PHOSPHATE 852; 155; 130 MG/1; MG/1; MG/1
1 TABLET ORAL ONCE
Refills: 0 | Status: COMPLETED | OUTPATIENT
Start: 2024-10-21 | End: 2024-10-21

## 2024-10-21 RX ORDER — ACETAMINOPHEN 500 MG
975 TABLET ORAL EVERY 6 HOURS
Refills: 0 | Status: DISCONTINUED | OUTPATIENT
Start: 2024-10-21 | End: 2024-10-21

## 2024-10-21 RX ADMIN — Medication 600 MILLIGRAM(S): at 05:04

## 2024-10-21 RX ADMIN — Medication 975 MILLIGRAM(S): at 10:22

## 2024-10-21 RX ADMIN — DIBASIC SODIUM PHOSPHATE, MONOBASIC POTASSIUM PHOSPHATE AND MONOBASIC SODIUM PHOSPHATE 1 PACKET(S): 852; 155; 130 TABLET ORAL at 10:22

## 2024-10-21 RX ADMIN — Medication 975 MILLIGRAM(S): at 11:22

## 2024-10-21 NOTE — PROGRESS NOTE ADULT - ASSESSMENT
Mr. Omer is a healthy 39 year old man presenting with one day of lower abdominal pain with laboratory studies and imaging consistent with acute uncomplicated appendicitis. S/p 10/20 lap appy.    Plan  - diet- c/w regular  - no abx  - Low molecular weight heparin and SCD for DVT prophylaxis.   - No intervention at this time for patient's reported history of chronic bloating. Symptoms as described sound consistent with irritable bowel syndrome. Can consider gastroenterology consult if remains inpatient for a prolonged period post-operatively but this complaint seems unrelated to the present bacterial infection for which operative source control is indicated.   - dispo- dcp today?    Trauma Surgery  [phone]

## 2024-10-21 NOTE — DISCHARGE NOTE PROVIDER - CARE PROVIDER_API CALL
Eugenio Leon  Surgery  [address] [place] [address], [address]  [place] [address]  Phone: [phone]  Fax: [phone]  Follow Up Time: 2 weeks

## 2024-10-21 NOTE — DISCHARGE NOTE PROVIDER - NSDCMRMEDTOKEN_GEN_ALL_CORE_FT
Valium 5 mg oral tablet: 5 milligram(s) orally every 8 hours Home med   oxyCODONE 5 mg oral tablet: 1 tab(s) orally every 6 hours as needed for  severe pain MDD: 4  Valium 5 mg oral tablet: 5 milligram(s) orally every 8 hours Home med

## 2024-10-21 NOTE — DISCHARGE NOTE NURSING/CASE MANAGEMENT/SOCIAL WORK - PATIENT PORTAL LINK FT
You can access the FollowMyHealth Patient Portal offered by [facility] by registering at the following website: [email or web address]. By joining [employer]’s FollowMyHealth portal, you will also be able to view your health information using other applications (apps) compatible with our system.

## 2024-10-21 NOTE — CHART NOTE - NSCHARTNOTEFT_GEN_A_CORE
POST-OP NOTE    KETAN RAGLAND | [id number] | [facility] 9MON 905 W1    Procedure: laparoscopic appendectomy    Subjective: patient resting comfortably in bed. started on regular diet. has not yet voided or has bowel function since operation. Abdomen soft. Incisions c/d/i    Vital Signs Last 24 Hrs  T(C): 36.7 (21 Oct 2024 03:30), Max: 36.9 (20 Oct 2024 11:07)  T(F): 98 (21 Oct 2024 03:30), Max: 98.4 (20 Oct 2024 11:07)  HR: 77 (21 Oct 2024 03:30) (73 - 94)  BP: 106/60 (21 Oct 2024 03:30) (106/60 - 146/96)  BP(mean): 78 (21 Oct 2024 01:00) (78 - 92)  RR: 18 (21 Oct 2024 03:30) (16 - 18)  SpO2: 94% (21 Oct 2024 03:30) (92% - 100%)    Parameters below as of 21 Oct 2024 03:30  Patient On (Oxygen Delivery Method): room air      I&O's Summary    20 Oct 2024 07:01  -  21 Oct 2024 04:11  --------------------------------------------------------  IN: 0 mL / OUT: 0 mL / NET: 0 mL                            14.9   13.64 )-----------( 283      ( 20 Oct 2024 09:12 )             44.0     10-20    139  |  103  |  18  ----------------------------<  104[H]  4.5   |  25  |  0.87    Ca    9.7      20 Oct 2024 09:12    TPro  7.5  /  Alb  4.6  /  TBili  0.3  /  DBili  x   /  AST  21  /  ALT  51[H]  /  AlkPhos  61  10-20   PT/INR - ( 20 Oct 2024 10:02 )   PT: 12.0 sec;   INR: 1.05 ratio         PTT - ( 20 Oct 2024 10:02 )  PTT:33.7 sec    PHYSICAL EXAM:  Gen: NAD  Resp: breathing easily, no stridor  CV: RRR  Abdomen: soft, nontender, nondistended  Skin: Incision c/d/i. Normal color, no rashes or lesions

## 2024-10-21 NOTE — DISCHARGE NOTE PROVIDER - HOSPITAL COURSE
39 year old man without significant pmhx or pshx presented on 10/20 with one day of acute onset lower abdominal pain, mild nausea. CT AP with IV contrast showed reveals acute uncomplicated appendicitis with an appendicolith. Pt is s/p lap appendectomy on 10/20. Pt recovered well in PACU with appropriate incisional pain, ambulating without issue, tolerated PO and voiding urine.     At the time of discharge, the patient was hemodynamically stable, was tolerating PO diet, was voiding urine, was ambulating, and was comfortable with adequate pain control. The patient was instructed to follow up with Dr. Rueda within 1-2 weeks after discharge from the hospital. The patient felt comfortable with discharge. The patient was discharged to home. The patient had no other issues.

## 2024-10-21 NOTE — PROGRESS NOTE ADULT - SUBJECTIVE AND OBJECTIVE BOX
SURGERY DAILY PROGRESS NOTE:     Overnight Events:  No acute events overnight.    SUBJECTIVE: Patient seen and evaluated on AM rounds. Pt is resting comfortably in bed with no complaints.    OBJECTIVE:  Vital Signs Last 24 Hrs  T(C): 36.8 (21 Oct 2024 05:02), Max: 36.9 (20 Oct 2024 11:07)  T(F): 98.3 (21 Oct 2024 05:02), Max: 98.4 (20 Oct 2024 11:07)  HR: 80 (21 Oct 2024 05:02) (73 - 94)  BP: 112/65 (21 Oct 2024 05:02) (106/60 - 134/87)  BP(mean): 78 (21 Oct 2024 01:00) (78 - 92)  RR: 18 (21 Oct 2024 05:02) (16 - 18)  SpO2: 97% (21 Oct 2024 05:02) (92% - 100%)    Parameters below as of 21 Oct 2024 05:02  Patient On (Oxygen Delivery Method): room air      I&O's Detail    20 Oct 2024 07:01  -  21 Oct 2024 07:00  --------------------------------------------------------  IN:    Oral Fluid: 120 mL  Total IN: 120 mL    OUT:    Voided (mL): 450 mL  Total OUT: 450 mL    Total NET: -330 mL        Daily Height in cm: 175.26 (20 Oct 2024 21:35)    Daily     LABS:                        13.3   9.07  )-----------( 267      ( 21 Oct 2024 07:12 )             39.1     10-20    139  |  103  |  18  ----------------------------<  104[H]  4.5   |  25  |  0.87    Ca    9.7      20 Oct 2024 09:12    TPro  7.5  /  Alb  4.6  /  TBili  0.3  /  DBili  x   /  AST  21  /  ALT  51[H]  /  AlkPhos  61  10-20    PT/INR - ( 20 Oct 2024 10:02 )   PT: 12.0 sec;   INR: 1.05 ratio         PTT - ( 20 Oct 2024 10:02 )  PTT:33.7 sec  Urinalysis Basic - ( 20 Oct 2024 14:12 )    Color: Yellow / Appearance: Clear / SG: >1.030 / pH: x  Gluc: x / Ketone: Negative mg/dL  / Bili: Negative / Urobili: 0.2 mg/dL   Blood: x / Protein: Negative mg/dL / Nitrite: Negative   Leuk Esterase: Negative / RBC: 2 /HPF / WBC 0 /HPF   Sq Epi: x / Non Sq Epi: 0 /HPF / Bacteria: Negative /HPF        PHYSICAL EXAM:  Gen: NAD  Resp: breathing easily, no stridor  CV: RRR  Abdomen: soft, nontender, nondistended  Skin: Incision c/d/i. Normal color, no rashes or lesions.

## 2024-10-21 NOTE — DISCHARGE NOTE NURSING/CASE MANAGEMENT/SOCIAL WORK - NSDCPEFALRISK_GEN_ALL_CORE
For information on Fall & Injury Prevention, visit: [email or web address] OR  [email or web address] OR  https://www.cdc.gov/steadi/patient.html

## 2024-10-21 NOTE — DISCHARGE NOTE NURSING/CASE MANAGEMENT/SOCIAL WORK - FINANCIAL ASSISTANCE
[facility] provides services at a reduced cost to those who are determined to be eligible through [facility]’s financial assistance program. Information regarding [facility]’s financial assistance program can be found by going to [email or web address] or by calling [phone].

## 2024-10-21 NOTE — DISCHARGE NOTE PROVIDER - CARE PROVIDERS DIRECT ADDRESSES
[email or web address]

## 2024-10-21 NOTE — DISCHARGE NOTE PROVIDER - NSDCCPTREATMENT_GEN_ALL_CORE_FT
PRINCIPAL PROCEDURE  Procedure: Laparoscopic appendectomy  Findings and Treatment: WOUND CARE: Staples/sutures will be removed at follow up office visit. Keep your incision(s) dry and covered with gauze and skin tape and change daily to monitor surrounding/expanding redness or pus drainage   BATHING: Please do not submerge wound underwater. You may shower and/or sponge bathe.  ACTIVITY: No heavy lifting anything more than 10-15lbs or straining. Otherwise, you may return to your usual level of physical activity.  DIET: Regular diet  NOTIFY YOUR SURGEON IF: You have any bleeding that does not stop, any pus draining from your wound, any fever (over 100.4 F) or chills, persistent nausea/vomiting with inability to tolerate food or liquids, persistent diarrhea, or if your pain is not controlled on your discharge pain medications.  FOLLOW-UP:  1. Please call to make a follow-up appointment with Dr. Rueda within one to two weeks of discharge   2. Please follow up with your primary care physician in one week regarding your hospitalization.       PRINCIPAL PROCEDURE  Procedure: Laparoscopic appendectomy  Findings and Treatment: WOUND CARE: Staples/sutures will be removed at follow up office visit. Keep your incision(s) dry and covered with gauze and skin tape and change daily to monitor surrounding/expanding redness or pus drainage. You may take ibuprofen 800mg every 6-8 hours, as needed, for pain, do not take more than 3200mg/day.  If you are taking narcotic pain medication (such as oxycodone), do NOT drive a car, operate machinery, consume alcohol or make important decisions.  BATHING: Please do not submerge wound underwater. You may shower and/or sponge bathe.  ACTIVITY: No heavy lifting anything more than 10-15lbs or straining. Otherwise, you may return to your usual level of physical activity.  DIET: Regular diet  NOTIFY YOUR SURGEON IF: You have any bleeding that does not stop, any pus draining from your wound, any fever (over 100.4 F) or chills, persistent nausea/vomiting with inability to tolerate food or liquids, persistent diarrhea, or if your pain is not controlled on your discharge pain medications.  FOLLOW-UP:  1. Please call to make a follow-up appointment with Dr. Rueda within one to two weeks of discharge   2. Please follow up with your primary care physician in one week regarding your hospitalization.

## 2024-10-23 LAB — SURGICAL PATHOLOGY STUDY: SIGNIFICANT CHANGE UP

## 2024-11-06 ENCOUNTER — APPOINTMENT (OUTPATIENT)
Dept: TRAUMA SURGERY | Facility: CLINIC | Age: 39
End: 2024-11-06
Payer: COMMERCIAL

## 2024-11-06 DIAGNOSIS — Z90.49 ACQUIRED ABSENCE OF OTHER SPECIFIED PARTS OF DIGESTIVE TRACT: ICD-10-CM

## 2024-11-06 PROCEDURE — 99024 POSTOP FOLLOW-UP VISIT: CPT

## (undated) DEVICE — TROCAR COVIDIEN VERSASTEP PLUS 11MM STANDARD

## (undated) DEVICE — TROCAR APPLIED MEDICAL KII BALLOON BLUNT TIP 12MM X 100MM

## (undated) DEVICE — INSUFFLATION NDL COVIDIEN STEP 14G FOR STEP/VERSASTEP

## (undated) DEVICE — SUT BIOSYN 4-0 18" P-12

## (undated) DEVICE — LIGASURE IMPACT

## (undated) DEVICE — INSUFFLATION NDL COVIDIEN STEP 14G SHORT FOR STEP/VERSASTEP

## (undated) DEVICE — TUBING INSUFFLATION LAP FILTER 10FT

## (undated) DEVICE — SPECIMEN CONTAINER 100ML

## (undated) DEVICE — GLV 8 PROTEXIS (WHITE)

## (undated) DEVICE — GLV 8.5 PROTEXIS (WHITE)

## (undated) DEVICE — BLADE SCALPEL SAFETYLOCK #10

## (undated) DEVICE — ENDOCATCH 10MM SPECIMEN POUCH

## (undated) DEVICE — TROCAR COVIDIEN VERSAPORT BLADELESS OPTICAL 5MM STANDARD

## (undated) DEVICE — LIGASURE BLUNT TIP 37CM

## (undated) DEVICE — LIGASURE MARYLAND 37CM

## (undated) DEVICE — DRSG OPSITE 2.5 X 2"

## (undated) DEVICE — GLV 7.5 PROTEXIS (WHITE)

## (undated) DEVICE — POSITIONER FOAM EGG CRATE ULNAR 2PCS (PINK)

## (undated) DEVICE — SOL IRR POUR H2O 250ML

## (undated) DEVICE — DISSECTOR ENDO PEANUT 5MM

## (undated) DEVICE — GLV 6.5 PROTEXIS (WHITE)

## (undated) DEVICE — TROCAR ETHICON ENDOPATH XCEL BLADELESS 5MM X 100MM STABILITY

## (undated) DEVICE — GOWN TRIMAX LG

## (undated) DEVICE — MEDICATION LABELS W MARKER

## (undated) DEVICE — DRAPE TOWEL BLUE 17" X 24"

## (undated) DEVICE — TROCAR COVIDIEN VERSASTEP PLUS 12MM STANDARD

## (undated) DEVICE — BLADE SCALPEL SAFETYLOCK #15

## (undated) DEVICE — TROCAR COVIDIEN BLUNT TIP HASSAN 12MM

## (undated) DEVICE — D HELP - CLEARVIEW CLEARIFY SYSTEM

## (undated) DEVICE — DRAPE INSTRUMENT POUCH 6.75" X 11"

## (undated) DEVICE — TROCAR APPLIED MEDICAL KII BALLOON BLUNT TIP 12MM X 130MM

## (undated) DEVICE — TUBING IRRIGATION DAVOL SYSTEM X STREAM

## (undated) DEVICE — DRSG STERISTRIPS 0.5 X 4"

## (undated) DEVICE — SUT VICRYL PLUS 0 27" CT-3

## (undated) DEVICE — DRAPE GENERAL ENDOSCOPY

## (undated) DEVICE — GLV 7 PROTEXIS (WHITE)

## (undated) DEVICE — PREP CHLORAPREP HI-LITE ORANGE 26ML

## (undated) DEVICE — VENODYNE/SCD SLEEVE CALF MEDIUM

## (undated) DEVICE — TROCAR COVIDIEN VERSASTEP 5MM SHORT

## (undated) DEVICE — ELCTR BOVIE PENCIL SMOKE EVACUATION

## (undated) DEVICE — SOL IRR POUR NS 0.9% 500ML

## (undated) DEVICE — STAPLER COVIDIEN ENDO GIA STANDARD HANDLE

## (undated) DEVICE — TROCAR COVIDIEN BLUNT TIP HASSAN 10MM

## (undated) DEVICE — DRSG TEGADERM 6"X8"

## (undated) DEVICE — TROCAR COVIDIEN STEP 5MM SHORT 70MM

## (undated) DEVICE — PACK LAP CHOLE LAP APPENDECTOMY

## (undated) DEVICE — WARMING BLANKET UPPER ADULT

## (undated) DEVICE — TROCAR COVIDIEN VERSAONE FIXATION CANNULA 5MM